# Patient Record
Sex: FEMALE | Race: WHITE | NOT HISPANIC OR LATINO | Employment: UNEMPLOYED | URBAN - METROPOLITAN AREA
[De-identification: names, ages, dates, MRNs, and addresses within clinical notes are randomized per-mention and may not be internally consistent; named-entity substitution may affect disease eponyms.]

---

## 2020-07-28 LAB — PAP: NORMAL

## 2020-11-23 LAB
C TRACH DNA SPEC QL PROBE+SIG AMP: NOT DETECTED
CULTURE MICRO: NORMAL
HBV SURFACE AG SERPL QL IA: NONREACTIVE
HCT VFR BLD AUTO: 37.2 %
HEMOGLOBIN: 12.1 G/DL (ref 11.7–15.7)
HIV 1+2 AB+HIV1 P24 AG SERPL QL IA: NONREACTIVE
N GONORRHOEA DNA SPEC QL PROBE+SIG AMP: NOT DETECTED
PLATELET # BLD AUTO: 310 10^9/L
RUBELLA ABY IGG: NORMAL

## 2021-04-01 ENCOUNTER — TELEPHONE (OUTPATIENT)
Dept: OBGYN | Facility: CLINIC | Age: 41
End: 2021-04-01

## 2021-04-01 NOTE — TELEPHONE ENCOUNTER
Phone Number: [phone]  Date of Request: 04/01/2021  Transferring From: [facility]  Reason: Work   LMP: 9/27/2020  CHERRIE: [id number]  Insurance: [facility]  Does patient want to see OB or Midwife: OB   Patient aware records need to be sent: YES

## 2021-04-07 ENCOUNTER — TRANSCRIBE ORDERS (OUTPATIENT)
Dept: MATERNAL FETAL MEDICINE | Facility: CLINIC | Age: 41
End: 2021-04-07

## 2021-04-07 ENCOUNTER — PRE VISIT (OUTPATIENT)
Dept: MATERNAL FETAL MEDICINE | Facility: CLINIC | Age: 41
End: 2021-04-07

## 2021-04-07 DIAGNOSIS — O09.522 MULTIGRAVIDA OF ADVANCED MATERNAL AGE IN SECOND TRIMESTER: Primary | ICD-10-CM

## 2021-04-07 DIAGNOSIS — O26.90 PREGNANCY RELATED CONDITION, ANTEPARTUM: Primary | ICD-10-CM

## 2021-04-07 NOTE — TELEPHONE ENCOUNTER
Transfer of prenatal care approved by JOANNA.     JOANNA saw that patient has MFM appt scheduled elsewhere on 04/18/21 and she put in a referral so that patient can be seen by RD MFM. If patient desires, may schedule with RD MFM on 04/18/21 if they have availability.     LVMTCB to let patient know transfer was approved and get her scheduled.     When patient calls back, please give her phone number for scheduling with MFM. If patient desires, please coordinate 30 min new prenatal midwife appt with her MFM appt.       Marcie

## 2021-04-08 NOTE — TELEPHONE ENCOUNTER
Transfer of prenatal care approved by RR.     Called patient to get scheduled with RR.     Patient needs to consider timing of her MFM appt so she will call back when she has a better view of her schedule to try to make both appts back to back, or at least all in one day.     When patient calls back, please warm transfer her to me by messaging me on Teams.       Marcie

## 2021-04-09 NOTE — TELEPHONE ENCOUNTER
Patient scheduled for new prenatal with RR on 04/15/21. Records sent for scanning into patient's chart.     Marcie

## 2021-04-12 ENCOUNTER — OFFICE VISIT (OUTPATIENT)
Dept: MATERNAL FETAL MEDICINE | Facility: CLINIC | Age: 41
End: 2021-04-12
Attending: ADVANCED PRACTICE MIDWIFE
Payer: COMMERCIAL

## 2021-04-12 ENCOUNTER — HOSPITAL ENCOUNTER (OUTPATIENT)
Dept: ULTRASOUND IMAGING | Facility: CLINIC | Age: 41
End: 2021-04-12
Attending: ADVANCED PRACTICE MIDWIFE
Payer: COMMERCIAL

## 2021-04-12 DIAGNOSIS — O36.5990 PREGNANCY AFFECTED BY FETAL GROWTH RESTRICTION: Primary | ICD-10-CM

## 2021-04-12 DIAGNOSIS — O26.90 PREGNANCY RELATED CONDITION, ANTEPARTUM: ICD-10-CM

## 2021-04-12 PROCEDURE — 59025 FETAL NON-STRESS TEST: CPT

## 2021-04-12 PROCEDURE — 76821 MIDDLE CEREBRAL ARTERY ECHO: CPT | Mod: 26 | Performed by: OBSTETRICS & GYNECOLOGY

## 2021-04-12 PROCEDURE — 59025 FETAL NON-STRESS TEST: CPT | Mod: 26 | Performed by: OBSTETRICS & GYNECOLOGY

## 2021-04-12 PROCEDURE — 76820 UMBILICAL ARTERY ECHO: CPT | Mod: 26 | Performed by: OBSTETRICS & GYNECOLOGY

## 2021-04-12 PROCEDURE — 99203 OFFICE O/P NEW LOW 30 MIN: CPT | Mod: 25 | Performed by: OBSTETRICS & GYNECOLOGY

## 2021-04-12 PROCEDURE — 76811 OB US DETAILED SNGL FETUS: CPT | Mod: 26 | Performed by: OBSTETRICS & GYNECOLOGY

## 2021-04-12 PROCEDURE — 76821 MIDDLE CEREBRAL ARTERY ECHO: CPT

## 2021-04-12 NOTE — NURSING NOTE
NST Performed due to FGR.  Dr. Morales reviewed efm tracing. See NST/BPP Doc Flowsheet tab.  Brittany Merino RN

## 2021-04-12 NOTE — PROGRESS NOTES
"Please see \"Imaging\" tab under \"Chart Review\" for details of today's visit.    Arlen Morales MD PhD  Maternal Fetal Medicine     "

## 2021-04-15 ENCOUNTER — OFFICE VISIT (OUTPATIENT)
Dept: MATERNAL FETAL MEDICINE | Facility: CLINIC | Age: 41
End: 2021-04-15
Attending: OBSTETRICS & GYNECOLOGY
Payer: COMMERCIAL

## 2021-04-15 ENCOUNTER — HOSPITAL ENCOUNTER (OUTPATIENT)
Dept: ULTRASOUND IMAGING | Facility: CLINIC | Age: 41
End: 2021-04-15
Attending: OBSTETRICS & GYNECOLOGY
Payer: COMMERCIAL

## 2021-04-15 ENCOUNTER — PRENATAL OFFICE VISIT (OUTPATIENT)
Dept: OBGYN | Facility: CLINIC | Age: 41
End: 2021-04-15
Payer: COMMERCIAL

## 2021-04-15 VITALS — DIASTOLIC BLOOD PRESSURE: 61 MMHG | WEIGHT: 139 LBS | SYSTOLIC BLOOD PRESSURE: 107 MMHG

## 2021-04-15 DIAGNOSIS — O36.5990 PREGNANCY AFFECTED BY FETAL GROWTH RESTRICTION: ICD-10-CM

## 2021-04-15 DIAGNOSIS — O09.529 SUPERVISION OF HIGH-RISK PREGNANCY OF ELDERLY MULTIGRAVIDA: Primary | ICD-10-CM

## 2021-04-15 PROCEDURE — 99207 PR FIRST OB VISIT: CPT | Performed by: OBSTETRICS & GYNECOLOGY

## 2021-04-15 PROCEDURE — 76815 OB US LIMITED FETUS(S): CPT

## 2021-04-15 PROCEDURE — 76816 OB US FOLLOW-UP PER FETUS: CPT | Mod: 26 | Performed by: OBSTETRICS & GYNECOLOGY

## 2021-04-15 PROCEDURE — 76820 UMBILICAL ARTERY ECHO: CPT | Mod: 26 | Performed by: OBSTETRICS & GYNECOLOGY

## 2021-04-15 PROCEDURE — 59025 FETAL NON-STRESS TEST: CPT

## 2021-04-15 PROCEDURE — 59025 FETAL NON-STRESS TEST: CPT | Mod: 26 | Performed by: OBSTETRICS & GYNECOLOGY

## 2021-04-15 PROCEDURE — 76816 OB US FOLLOW-UP PER FETUS: CPT

## 2021-04-15 RX ORDER — PRENATAL VIT/IRON FUM/FOLIC AC 27MG-0.8MG
1 TABLET ORAL DAILY
COMMUNITY
End: 2023-10-09

## 2021-04-15 NOTE — NURSING NOTE
NST Performed due to Fetal growth restriction.  Dr. Capone reviewed efm tracing. See NST/BPP Doc Flowsheet tab.

## 2021-04-15 NOTE — Clinical Note
Can you please call patient and help her get set up for her COVID vaccines?  She wants to do them prior to her tdap.  Thanks. RR

## 2021-04-15 NOTE — PROGRESS NOTES
SUBJECTIVE:   Estrellita Caballero is a 40 year old female  at 28w4d by 8+1 wk US who presents for a new transfer of care visit.  She moved to [place] one month ago from [place] where she has previously established care.   She is feeling well today.  Reports normal FM.  No contractions.  This is a girl.   First baby delivered normally at 38 wks, boy 5-12 at birth.     Pregnancy is complicated by:  AMA - had a normal aneuploidy screening early on  Placenta previa which has now resolved  FGR -- with MFM US here showing  EFW 8th percentile, AC 10th percentile on .  Patient states that she had a growth US in [place] yesterday which showed overall growth at 16%.  A repeat growth US was done today although the report is not available.   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   She is currently scheduled for twice weekly BPP's and q 3 wk growth US's.  Also scheduled for fetal ECHO given small pericardial effusion noted on US .      Patient would like to get set up for her COVID vaccines prior to doing the tdap.     OB History    Para Term  AB Living   2 1 1 0 0 1   SAB TAB Ectopic Multiple Live Births   0 0 0 0 1      # Outcome Date GA Lbr Jalen/2nd Weight Sex Delivery Anes PTL Lv   2 Current            1 Term      Vag-Spont   ANDRES        Current Outpatient Medications   Medication     Prenatal Vit-Fe Fumarate-FA (PRENATAL MULTIVITAMIN W/IRON) 27-0.8 MG tablet     No current facility-administered medications for this visit.        No Known Allergies    EXAM:  LMP 2020  107/61  85 139 lbs     GENERAL: WDWN WF in NAD  normal respiratory and cardiovascular effort   ABDOMEN: S, NT, no palpable masses or hepatosplenomegaly  Uterus gravid, NT  MUSCULOSKELETAL: no obvious abnormalities.  NEUROLOGICAL: normal strength, sensation, mental status  PSYCH: normal affect, appropriate  PELVIC:  - deferred.     ASSESSMENT/ PLAN:  IUP @  28w4d by 8w1d US  Estimated Date of Delivery: 2021     Encounter Diagnoses   Name Primary?     Supervision of high-risk pregnancy of elderly multigravida Yes     Pregnancy affected by fetal growth restriction       Prenatal chart was reviewed today along with MFM US from 21  Reviewed appt schedule and answered patient questions regarding  testing, normal expectations for prenatal care with FGR fetus, possible IOL prior to CHERRIE pending fetal well being.   Patient will be set up for COVID vaccine and plan tdap afterward.     Orders Placed This Encounter   Procedures     CBC with platelets     Hepatitis B surface antigen     OB hemoglobin     Conventional pap smear, diagnostic     HIV Antigen Antibody Combo     Rubella Antibody IgG Quantitative     Return to Clinic in 4 weeks or sooner if problems arise.    Laura Jurado MD

## 2021-04-20 ENCOUNTER — IMMUNIZATION (OUTPATIENT)
Dept: NURSING | Facility: CLINIC | Age: 41
End: 2021-04-20
Payer: COMMERCIAL

## 2021-04-20 ENCOUNTER — OFFICE VISIT (OUTPATIENT)
Dept: MATERNAL FETAL MEDICINE | Facility: CLINIC | Age: 41
End: 2021-04-20
Attending: OBSTETRICS & GYNECOLOGY
Payer: COMMERCIAL

## 2021-04-20 ENCOUNTER — HOSPITAL ENCOUNTER (OUTPATIENT)
Dept: ULTRASOUND IMAGING | Facility: CLINIC | Age: 41
End: 2021-04-20
Attending: OBSTETRICS & GYNECOLOGY
Payer: COMMERCIAL

## 2021-04-20 DIAGNOSIS — O36.5990 PREGNANCY AFFECTED BY FETAL GROWTH RESTRICTION: Primary | ICD-10-CM

## 2021-04-20 DIAGNOSIS — O36.5990 PREGNANCY AFFECTED BY FETAL GROWTH RESTRICTION: ICD-10-CM

## 2021-04-20 PROCEDURE — 76820 UMBILICAL ARTERY ECHO: CPT | Mod: 26 | Performed by: OBSTETRICS & GYNECOLOGY

## 2021-04-20 PROCEDURE — 59025 FETAL NON-STRESS TEST: CPT | Mod: 26 | Performed by: OBSTETRICS & GYNECOLOGY

## 2021-04-20 PROCEDURE — 59025 FETAL NON-STRESS TEST: CPT

## 2021-04-20 PROCEDURE — 0001A PR COVID VAC PFIZER DIL RECON 30 MCG/0.3 ML IM: CPT

## 2021-04-20 PROCEDURE — 76820 UMBILICAL ARTERY ECHO: CPT

## 2021-04-20 PROCEDURE — 91300 PR COVID VAC PFIZER DIL RECON 30 MCG/0.3 ML IM: CPT

## 2021-04-20 PROCEDURE — 76815 OB US LIMITED FETUS(S): CPT | Mod: 26 | Performed by: OBSTETRICS & GYNECOLOGY

## 2021-04-20 NOTE — PROGRESS NOTES
"Please see \"Imaging\" tab under \"Chart Review\" for details of today's visit.    Lance Rueda    "

## 2021-04-23 ENCOUNTER — HOSPITAL ENCOUNTER (OUTPATIENT)
Dept: ULTRASOUND IMAGING | Facility: CLINIC | Age: 41
End: 2021-04-23
Attending: OBSTETRICS & GYNECOLOGY
Payer: COMMERCIAL

## 2021-04-23 ENCOUNTER — OFFICE VISIT (OUTPATIENT)
Dept: MATERNAL FETAL MEDICINE | Facility: CLINIC | Age: 41
End: 2021-04-23
Attending: OBSTETRICS & GYNECOLOGY
Payer: COMMERCIAL

## 2021-04-23 DIAGNOSIS — O36.5990 PREGNANCY AFFECTED BY FETAL GROWTH RESTRICTION: ICD-10-CM

## 2021-04-23 DIAGNOSIS — O36.5990 PREGNANCY AFFECTED BY FETAL GROWTH RESTRICTION: Primary | ICD-10-CM

## 2021-04-23 PROCEDURE — 76815 OB US LIMITED FETUS(S): CPT

## 2021-04-23 PROCEDURE — 76820 UMBILICAL ARTERY ECHO: CPT | Mod: 26 | Performed by: OBSTETRICS & GYNECOLOGY

## 2021-04-23 PROCEDURE — 59025 FETAL NON-STRESS TEST: CPT | Mod: 26 | Performed by: OBSTETRICS & GYNECOLOGY

## 2021-04-23 PROCEDURE — 59025 FETAL NON-STRESS TEST: CPT

## 2021-04-23 PROCEDURE — 76816 OB US FOLLOW-UP PER FETUS: CPT | Mod: 26 | Performed by: OBSTETRICS & GYNECOLOGY

## 2021-04-23 PROCEDURE — 76816 OB US FOLLOW-UP PER FETUS: CPT

## 2021-04-23 NOTE — NURSING NOTE
NST Performed due to FGR.   reviewed efm tracing. See NST/BPP Doc Flowsheet tab.  Brittany Merino RN

## 2021-04-23 NOTE — PROGRESS NOTES
"Please see \"Imaging\" tab under \"Chart Review\" for details of today's US at the [facility].    Roderick Diaz MD  Maternal-Fetal Medicine      "

## 2021-04-27 ENCOUNTER — PRENATAL OFFICE VISIT (OUTPATIENT)
Dept: OBGYN | Facility: CLINIC | Age: 41
End: 2021-04-27
Payer: COMMERCIAL

## 2021-04-27 ENCOUNTER — HOSPITAL ENCOUNTER (OUTPATIENT)
Dept: ULTRASOUND IMAGING | Facility: CLINIC | Age: 41
End: 2021-04-27
Attending: OBSTETRICS & GYNECOLOGY
Payer: COMMERCIAL

## 2021-04-27 ENCOUNTER — OFFICE VISIT (OUTPATIENT)
Dept: MATERNAL FETAL MEDICINE | Facility: CLINIC | Age: 41
End: 2021-04-27
Attending: OBSTETRICS & GYNECOLOGY
Payer: COMMERCIAL

## 2021-04-27 VITALS
WEIGHT: 140.6 LBS | SYSTOLIC BLOOD PRESSURE: 111 MMHG | BODY MASS INDEX: 22.07 KG/M2 | OXYGEN SATURATION: 100 % | HEART RATE: 86 BPM | DIASTOLIC BLOOD PRESSURE: 62 MMHG | HEIGHT: 67 IN

## 2021-04-27 DIAGNOSIS — O36.5990 PREGNANCY AFFECTED BY FETAL GROWTH RESTRICTION: ICD-10-CM

## 2021-04-27 DIAGNOSIS — O09.529 SUPERVISION OF HIGH-RISK PREGNANCY OF ELDERLY MULTIGRAVIDA: Primary | ICD-10-CM

## 2021-04-27 PROCEDURE — 76815 OB US LIMITED FETUS(S): CPT | Mod: 26 | Performed by: OBSTETRICS & GYNECOLOGY

## 2021-04-27 PROCEDURE — 76820 UMBILICAL ARTERY ECHO: CPT | Mod: 26 | Performed by: OBSTETRICS & GYNECOLOGY

## 2021-04-27 PROCEDURE — 76820 UMBILICAL ARTERY ECHO: CPT

## 2021-04-27 PROCEDURE — 99207 PR PRENATAL VISIT: CPT | Performed by: OBSTETRICS & GYNECOLOGY

## 2021-04-27 PROCEDURE — 59025 FETAL NON-STRESS TEST: CPT

## 2021-04-27 SDOH — HEALTH STABILITY: MENTAL HEALTH: HOW OFTEN DO YOU HAVE 6 OR MORE DRINKS ON ONE OCCASION?: NOT ASKED

## 2021-04-27 SDOH — HEALTH STABILITY: MENTAL HEALTH: HOW OFTEN DO YOU HAVE A DRINK CONTAINING ALCOHOL?: NOT ASKED

## 2021-04-27 SDOH — HEALTH STABILITY: MENTAL HEALTH: HOW MANY STANDARD DRINKS CONTAINING ALCOHOL DO YOU HAVE ON A TYPICAL DAY?: NOT ASKED

## 2021-04-27 ASSESSMENT — MIFFLIN-ST. JEOR: SCORE: 1350.39

## 2021-04-27 NOTE — PROGRESS NOTES
Estrellita Caballero was seen for an ultrasound today at the Maternal-Fetal Medicine center.      For the details of the ultrasound please see the report which can be found under the imaging tab.      Arlen Trejo MD  , OB/GYN  Maternal-Fetal Medicine  [email or web address]  [phone] (Main M Office)  [phone] or [phone] (for 24 hour MFM questions)  [phone] (Pager)

## 2021-04-27 NOTE — PROGRESS NOTES
Please see ultrasound report under Imaging tab for details of today's ultrasound.    Augusta Capone MD  Maternal-Fetal Medicine

## 2021-04-27 NOTE — NURSING NOTE
NST Performed due to FGR.   reviewed efm tracing. See NST/BPP Doc Flowsheet tab.  Kailee Ragland RN

## 2021-04-27 NOTE — PROGRESS NOTES
30w2d  No complaints.  Baby is moving well. US last week showed no pericardial effusion and normal dopplers and fluid.   Has growth US next week.  Had first covid shot last week.  RTC 2 wks.  RR

## 2021-04-29 DIAGNOSIS — O36.5990 PREGNANCY AFFECTED BY FETAL GROWTH RESTRICTION: Primary | ICD-10-CM

## 2021-04-30 ENCOUNTER — OFFICE VISIT (OUTPATIENT)
Dept: MATERNAL FETAL MEDICINE | Facility: CLINIC | Age: 41
End: 2021-04-30
Attending: OBSTETRICS & GYNECOLOGY
Payer: COMMERCIAL

## 2021-04-30 ENCOUNTER — HOSPITAL ENCOUNTER (OUTPATIENT)
Dept: ULTRASOUND IMAGING | Facility: CLINIC | Age: 41
End: 2021-04-30
Attending: OBSTETRICS & GYNECOLOGY
Payer: COMMERCIAL

## 2021-04-30 DIAGNOSIS — O36.5990 PREGNANCY AFFECTED BY FETAL GROWTH RESTRICTION: ICD-10-CM

## 2021-04-30 PROCEDURE — 76815 OB US LIMITED FETUS(S): CPT | Mod: 26 | Performed by: OBSTETRICS & GYNECOLOGY

## 2021-04-30 PROCEDURE — 59025 FETAL NON-STRESS TEST: CPT

## 2021-04-30 PROCEDURE — 76820 UMBILICAL ARTERY ECHO: CPT

## 2021-04-30 PROCEDURE — 76820 UMBILICAL ARTERY ECHO: CPT | Mod: 26 | Performed by: OBSTETRICS & GYNECOLOGY

## 2021-04-30 PROCEDURE — 59025 FETAL NON-STRESS TEST: CPT | Mod: 26 | Performed by: OBSTETRICS & GYNECOLOGY

## 2021-05-02 ENCOUNTER — HEALTH MAINTENANCE LETTER (OUTPATIENT)
Age: 41
End: 2021-05-02

## 2021-05-04 ENCOUNTER — OFFICE VISIT (OUTPATIENT)
Dept: MATERNAL FETAL MEDICINE | Facility: CLINIC | Age: 41
End: 2021-05-04
Attending: OBSTETRICS & GYNECOLOGY
Payer: COMMERCIAL

## 2021-05-04 ENCOUNTER — HOSPITAL ENCOUNTER (OUTPATIENT)
Dept: ULTRASOUND IMAGING | Facility: CLINIC | Age: 41
End: 2021-05-04
Attending: OBSTETRICS & GYNECOLOGY
Payer: COMMERCIAL

## 2021-05-04 DIAGNOSIS — O36.5990 PREGNANCY AFFECTED BY FETAL GROWTH RESTRICTION: Primary | ICD-10-CM

## 2021-05-04 DIAGNOSIS — O36.5990 PREGNANCY AFFECTED BY FETAL GROWTH RESTRICTION: ICD-10-CM

## 2021-05-04 PROCEDURE — 76816 OB US FOLLOW-UP PER FETUS: CPT | Mod: 26 | Performed by: OBSTETRICS & GYNECOLOGY

## 2021-05-04 PROCEDURE — 76820 UMBILICAL ARTERY ECHO: CPT | Mod: 26 | Performed by: OBSTETRICS & GYNECOLOGY

## 2021-05-04 PROCEDURE — 76816 OB US FOLLOW-UP PER FETUS: CPT

## 2021-05-04 PROCEDURE — 76820 UMBILICAL ARTERY ECHO: CPT

## 2021-05-04 PROCEDURE — 59025 FETAL NON-STRESS TEST: CPT

## 2021-05-04 PROCEDURE — 59025 FETAL NON-STRESS TEST: CPT | Mod: 26 | Performed by: OBSTETRICS & GYNECOLOGY

## 2021-05-04 NOTE — PROGRESS NOTES
"Please see \"Imaging\" tab under \"Chart Review\" for details of today's US at the [facility].    Roderick Diaz MD  Maternal-Fetal Medicine      "

## 2021-05-06 ENCOUNTER — HOSPITAL ENCOUNTER (OUTPATIENT)
Dept: ULTRASOUND IMAGING | Facility: CLINIC | Age: 41
End: 2021-05-06
Attending: OBSTETRICS & GYNECOLOGY
Payer: COMMERCIAL

## 2021-05-06 ENCOUNTER — OFFICE VISIT (OUTPATIENT)
Dept: MATERNAL FETAL MEDICINE | Facility: CLINIC | Age: 41
End: 2021-05-06
Attending: OBSTETRICS & GYNECOLOGY
Payer: COMMERCIAL

## 2021-05-06 DIAGNOSIS — O36.5990 PREGNANCY AFFECTED BY FETAL GROWTH RESTRICTION: ICD-10-CM

## 2021-05-06 PROCEDURE — 76820 UMBILICAL ARTERY ECHO: CPT | Mod: 26 | Performed by: OBSTETRICS & GYNECOLOGY

## 2021-05-06 PROCEDURE — 76820 UMBILICAL ARTERY ECHO: CPT

## 2021-05-06 PROCEDURE — 76815 OB US LIMITED FETUS(S): CPT | Mod: 26 | Performed by: OBSTETRICS & GYNECOLOGY

## 2021-05-06 PROCEDURE — 59025 FETAL NON-STRESS TEST: CPT | Mod: 26 | Performed by: OBSTETRICS & GYNECOLOGY

## 2021-05-06 PROCEDURE — 59025 FETAL NON-STRESS TEST: CPT

## 2021-05-06 NOTE — PROGRESS NOTES
Please see the imaging tab for details of the ultrasound performed today.    Sherry Dixon MD  Specialist in Maternal-Fetal Medicine

## 2021-05-10 ENCOUNTER — PRENATAL OFFICE VISIT (OUTPATIENT)
Dept: OBGYN | Facility: CLINIC | Age: 41
End: 2021-05-10
Payer: COMMERCIAL

## 2021-05-10 ENCOUNTER — OFFICE VISIT (OUTPATIENT)
Dept: MATERNAL FETAL MEDICINE | Facility: CLINIC | Age: 41
End: 2021-05-10
Attending: OBSTETRICS & GYNECOLOGY
Payer: COMMERCIAL

## 2021-05-10 ENCOUNTER — HOSPITAL ENCOUNTER (OUTPATIENT)
Dept: ULTRASOUND IMAGING | Facility: CLINIC | Age: 41
End: 2021-05-10
Attending: OBSTETRICS & GYNECOLOGY
Payer: COMMERCIAL

## 2021-05-10 VITALS
HEIGHT: 67 IN | HEART RATE: 89 BPM | BODY MASS INDEX: 22.46 KG/M2 | DIASTOLIC BLOOD PRESSURE: 50 MMHG | SYSTOLIC BLOOD PRESSURE: 113 MMHG | WEIGHT: 143.1 LBS | OXYGEN SATURATION: 100 %

## 2021-05-10 DIAGNOSIS — O09.523 MULTIGRAVIDA OF ADVANCED MATERNAL AGE IN THIRD TRIMESTER: ICD-10-CM

## 2021-05-10 DIAGNOSIS — O36.5990 PREGNANCY AFFECTED BY FETAL GROWTH RESTRICTION: Primary | ICD-10-CM

## 2021-05-10 DIAGNOSIS — O36.5990 PREGNANCY AFFECTED BY FETAL GROWTH RESTRICTION: ICD-10-CM

## 2021-05-10 PROCEDURE — 76815 OB US LIMITED FETUS(S): CPT

## 2021-05-10 PROCEDURE — 99207 PR PRENATAL VISIT: CPT | Performed by: OBSTETRICS & GYNECOLOGY

## 2021-05-10 PROCEDURE — 76815 OB US LIMITED FETUS(S): CPT | Mod: 26 | Performed by: OBSTETRICS & GYNECOLOGY

## 2021-05-10 PROCEDURE — 76820 UMBILICAL ARTERY ECHO: CPT | Mod: 26 | Performed by: OBSTETRICS & GYNECOLOGY

## 2021-05-10 PROCEDURE — 59025 FETAL NON-STRESS TEST: CPT | Mod: 26 | Performed by: OBSTETRICS & GYNECOLOGY

## 2021-05-10 PROCEDURE — 59025 FETAL NON-STRESS TEST: CPT

## 2021-05-10 ASSESSMENT — MIFFLIN-ST. JEOR: SCORE: 1361.73

## 2021-05-10 NOTE — PROGRESS NOTES
32w1d  Active fetal movement. No leaking or bleeding. No contractions.  She is getting second COVID19 vaccine tomorrow.    Feeling frustrated by [facility]'s mixed messages about fetal pericardial effusion. EFW : 1420g (3lb 2oz)    US today:  1. Mac intrauterine pregnancy at 32w1d with fetal growth restriction (EFW 5th% on ) here for  surveillance.  2. The amniotic fluid volume appeared normal.  3. The umbilical artery dopplers again are intermittently elevated. There is no evidence of absent or reversed end diastolic flow.  4. There again is a trace pericardial effusion seen. There is no evidence of fetal hydrops.  5. The NST is reactive and reassuring.    Fetal echo sched .   Questions about IUGR and delivery timing. Discussed it depends on dopplers and BPPs.     RTC 2 weeks.   Arlen Fox MD

## 2021-05-10 NOTE — PROGRESS NOTES
The patient was seen for an ultrasound in the Maternal-Fetal Medicine Center at the [facility] today.  For a detailed report of the ultrasound examination, please see the ultrasound report which can be found under the imaging tab.    Sherin Cordoba MD  , OB/GYN  Maternal-Fetal Medicine  [phone] (Pager)

## 2021-05-10 NOTE — NURSING NOTE
NST Performed due to fetal growth restriction.  Dr. Cordoba reviewed efm tracing. See NST/BPP Doc Flowsheet tab.

## 2021-05-10 NOTE — PATIENT INSTRUCTIONS
Nurse line: [phone]    Estrellita -   I reviewed delivery recommendation timing for fetal growth restriction  As we discussed, in uncomplicated growth restriction, delivery between 38-39w6d is recommended.   If dopplers are elevated, then delivery is recommended at 37 weeks or at diagnosis if diagnosed later  If dopplers are absent or reversed, then  delivery is recommended, usually after the steroids shots for fetal lung maturity as we discussed.     I know it is complicated but these are the general guidelines we follow.

## 2021-05-11 ENCOUNTER — IMMUNIZATION (OUTPATIENT)
Dept: NURSING | Facility: CLINIC | Age: 41
End: 2021-05-11
Attending: INTERNAL MEDICINE
Payer: COMMERCIAL

## 2021-05-11 PROCEDURE — 0002A PR COVID VAC PFIZER DIL RECON 30 MCG/0.3 ML IM: CPT

## 2021-05-11 PROCEDURE — 91300 PR COVID VAC PFIZER DIL RECON 30 MCG/0.3 ML IM: CPT

## 2021-05-13 ENCOUNTER — HOSPITAL ENCOUNTER (OUTPATIENT)
Dept: ULTRASOUND IMAGING | Facility: CLINIC | Age: 41
End: 2021-05-13
Attending: OBSTETRICS & GYNECOLOGY
Payer: COMMERCIAL

## 2021-05-13 ENCOUNTER — OFFICE VISIT (OUTPATIENT)
Dept: MATERNAL FETAL MEDICINE | Facility: CLINIC | Age: 41
End: 2021-05-13
Attending: OBSTETRICS & GYNECOLOGY
Payer: COMMERCIAL

## 2021-05-13 DIAGNOSIS — O36.5990 PREGNANCY AFFECTED BY FETAL GROWTH RESTRICTION: ICD-10-CM

## 2021-05-13 DIAGNOSIS — O36.5990 PREGNANCY AFFECTED BY FETAL GROWTH RESTRICTION: Primary | ICD-10-CM

## 2021-05-13 PROCEDURE — 76820 UMBILICAL ARTERY ECHO: CPT | Mod: 26 | Performed by: OBSTETRICS & GYNECOLOGY

## 2021-05-13 PROCEDURE — 76815 OB US LIMITED FETUS(S): CPT | Mod: 26 | Performed by: OBSTETRICS & GYNECOLOGY

## 2021-05-13 PROCEDURE — 76815 OB US LIMITED FETUS(S): CPT

## 2021-05-13 PROCEDURE — 59025 FETAL NON-STRESS TEST: CPT | Mod: 26 | Performed by: OBSTETRICS & GYNECOLOGY

## 2021-05-13 PROCEDURE — 59025 FETAL NON-STRESS TEST: CPT

## 2021-05-13 NOTE — PROGRESS NOTES
"Please see \"Imaging\" tab under \"Chart Review\" for details of today's US at the [facility].    Roderick Diaz MD  Maternal-Fetal Medicine      "

## 2021-05-18 ENCOUNTER — OFFICE VISIT (OUTPATIENT)
Dept: MATERNAL FETAL MEDICINE | Facility: CLINIC | Age: 41
End: 2021-05-18
Attending: OBSTETRICS & GYNECOLOGY
Payer: COMMERCIAL

## 2021-05-18 ENCOUNTER — HOSPITAL ENCOUNTER (OUTPATIENT)
Dept: CARDIOLOGY | Facility: CLINIC | Age: 41
End: 2021-05-18
Attending: OBSTETRICS & GYNECOLOGY
Payer: COMMERCIAL

## 2021-05-18 ENCOUNTER — OFFICE VISIT (OUTPATIENT)
Dept: CARDIOLOGY | Facility: CLINIC | Age: 41
End: 2021-05-18
Payer: COMMERCIAL

## 2021-05-18 ENCOUNTER — HOSPITAL ENCOUNTER (OUTPATIENT)
Dept: ULTRASOUND IMAGING | Facility: CLINIC | Age: 41
End: 2021-05-18
Attending: OBSTETRICS & GYNECOLOGY
Payer: COMMERCIAL

## 2021-05-18 DIAGNOSIS — O36.5990 PREGNANCY AFFECTED BY FETAL GROWTH RESTRICTION: Primary | ICD-10-CM

## 2021-05-18 DIAGNOSIS — O36.5990 PREGNANCY AFFECTED BY FETAL GROWTH RESTRICTION: ICD-10-CM

## 2021-05-18 DIAGNOSIS — O35.BXX0 FETAL CARDIAC DISEASE AFFECTING PREGNANCY, SINGLE OR UNSPECIFIED FETUS: Primary | ICD-10-CM

## 2021-05-18 PROCEDURE — 76827 ECHO EXAM OF FETAL HEART: CPT | Mod: 26 | Performed by: PEDIATRICS

## 2021-05-18 PROCEDURE — 59025 FETAL NON-STRESS TEST: CPT | Mod: 26 | Performed by: OBSTETRICS & GYNECOLOGY

## 2021-05-18 PROCEDURE — 76820 UMBILICAL ARTERY ECHO: CPT | Mod: 26 | Performed by: OBSTETRICS & GYNECOLOGY

## 2021-05-18 PROCEDURE — 76815 OB US LIMITED FETUS(S): CPT | Mod: 26 | Performed by: OBSTETRICS & GYNECOLOGY

## 2021-05-18 PROCEDURE — 99202 OFFICE O/P NEW SF 15 MIN: CPT | Mod: 25 | Performed by: PEDIATRICS

## 2021-05-18 PROCEDURE — 93325 DOPPLER ECHO COLOR FLOW MAPG: CPT | Mod: 26 | Performed by: PEDIATRICS

## 2021-05-18 PROCEDURE — 93325 DOPPLER ECHO COLOR FLOW MAPG: CPT

## 2021-05-18 PROCEDURE — 76820 UMBILICAL ARTERY ECHO: CPT

## 2021-05-18 PROCEDURE — 59025 FETAL NON-STRESS TEST: CPT

## 2021-05-18 PROCEDURE — 76825 ECHO EXAM OF FETAL HEART: CPT | Mod: 26 | Performed by: PEDIATRICS

## 2021-05-18 NOTE — PROGRESS NOTES
[facility]   Heart Center Fetal Consult Note    Patient:  Estrellita Caballero MRN:  [id number]   YOB: 1980 Age:  40 year old   Date of Visit:  2021 PCP:  No Ref-Primary, Physician     Dear Dr. Morales,     I had the pleasure of seeing Estrellita Caballero at the [facility] on 2021 in fetal cardiology consultation for fetal echocardiogram results. She presented today accompanied by herself. As you know, she is a 40 year old  at 33w2d who presented for fetal echocardiogram today because of suspected pericardial effusion.    I performed and interpreted the fetal echocardiogram today, which demonstrated Normal fetal cardiac anatomy. Normal fetal intracardiac connections. Normal right and left ventricular size and function. Fetal heart rate is regular at 147 bpm. No effusion.      I reviewed the echo findings today with Estrellita Caballero. She is aware that the study was within normal limits with no major cardiac abnormalities. She is aware of the general limitations of fetal echocardiography. No additional fetal echocardiograms are recommended. No  cardiac follow-up is required.     Thank you for allowing me to participate in Estrellita's care. Please do not hesitate to contact me with questions or concerns.    This visit was separate from the performance and interpretation of the ultrasound. The majority of the time (>50%) was spent in counseling and coordination of care. I spent approximately 15 minutes in face-to-face time reviewing the above considerations.    Barrington Landaverde M.D.  Pediatric Cardiology  [address], 5th floor, [facility] [address]  Phone [phone]  Fax [phone]

## 2021-05-18 NOTE — NURSING NOTE
NST Performed due to fetal growth restriction.   reviewed efm tracing. See NST/BPP Doc Flowsheet tab..

## 2021-05-18 NOTE — PROGRESS NOTES
"Please see \"Imaging\" tab under \"Chart Review\" for details of today's US at the [facility].    Roderick Diaz MD  Maternal-Fetal Medicine      "

## 2021-05-21 ENCOUNTER — OFFICE VISIT (OUTPATIENT)
Dept: MATERNAL FETAL MEDICINE | Facility: CLINIC | Age: 41
End: 2021-05-21
Attending: OBSTETRICS & GYNECOLOGY
Payer: COMMERCIAL

## 2021-05-21 ENCOUNTER — RECORDS - HEALTHEAST (OUTPATIENT)
Dept: ADMINISTRATIVE | Facility: OTHER | Age: 41
End: 2021-05-21

## 2021-05-21 ENCOUNTER — HOSPITAL ENCOUNTER (OUTPATIENT)
Dept: ULTRASOUND IMAGING | Facility: CLINIC | Age: 41
End: 2021-05-21
Attending: OBSTETRICS & GYNECOLOGY
Payer: COMMERCIAL

## 2021-05-21 DIAGNOSIS — O36.5990 PREGNANCY AFFECTED BY FETAL GROWTH RESTRICTION: ICD-10-CM

## 2021-05-21 PROCEDURE — 76815 OB US LIMITED FETUS(S): CPT | Mod: 26 | Performed by: OBSTETRICS & GYNECOLOGY

## 2021-05-21 PROCEDURE — 76820 UMBILICAL ARTERY ECHO: CPT | Mod: 26 | Performed by: OBSTETRICS & GYNECOLOGY

## 2021-05-21 PROCEDURE — 59025 FETAL NON-STRESS TEST: CPT | Performed by: OBSTETRICS & GYNECOLOGY

## 2021-05-21 PROCEDURE — 59025 FETAL NON-STRESS TEST: CPT | Mod: 26 | Performed by: OBSTETRICS & GYNECOLOGY

## 2021-05-21 PROCEDURE — 76815 OB US LIMITED FETUS(S): CPT

## 2021-05-21 NOTE — PROGRESS NOTES
Please refer to ultrasound report under 'Imaging' Studies of 'Chart Review' tabs.    Jin Vilchis M.D.

## 2021-05-25 ENCOUNTER — OFFICE VISIT (OUTPATIENT)
Dept: MATERNAL FETAL MEDICINE | Facility: CLINIC | Age: 41
End: 2021-05-25
Attending: OBSTETRICS & GYNECOLOGY
Payer: COMMERCIAL

## 2021-05-25 ENCOUNTER — HOSPITAL ENCOUNTER (OUTPATIENT)
Dept: ULTRASOUND IMAGING | Facility: CLINIC | Age: 41
End: 2021-05-25
Attending: OBSTETRICS & GYNECOLOGY
Payer: COMMERCIAL

## 2021-05-25 DIAGNOSIS — O36.5990 PREGNANCY AFFECTED BY FETAL GROWTH RESTRICTION: ICD-10-CM

## 2021-05-25 DIAGNOSIS — O36.5990 PREGNANCY AFFECTED BY FETAL GROWTH RESTRICTION: Primary | ICD-10-CM

## 2021-05-25 PROBLEM — E28.2 PCOS (POLYCYSTIC OVARIAN SYNDROME): Status: ACTIVE | Noted: 2019-05-24

## 2021-05-25 PROBLEM — Z34.80 ENCOUNTER FOR SUPERVISION OF OTHER NORMAL PREGNANCY, UNSPECIFIED TRIMESTER: Status: ACTIVE | Noted: 2020-11-23

## 2021-05-25 PROBLEM — N97.9 INFERTILITY, FEMALE: Status: ACTIVE | Noted: 2019-05-24

## 2021-05-25 PROBLEM — O09.522 MULTIGRAVIDA OF ADVANCED MATERNAL AGE IN SECOND TRIMESTER: Status: ACTIVE | Noted: 2019-07-16

## 2021-05-25 PROCEDURE — 76816 OB US FOLLOW-UP PER FETUS: CPT

## 2021-05-25 PROCEDURE — 76816 OB US FOLLOW-UP PER FETUS: CPT | Mod: 26 | Performed by: OBSTETRICS & GYNECOLOGY

## 2021-05-25 PROCEDURE — 76820 UMBILICAL ARTERY ECHO: CPT | Mod: 26 | Performed by: OBSTETRICS & GYNECOLOGY

## 2021-05-25 PROCEDURE — 59025 FETAL NON-STRESS TEST: CPT | Mod: 26 | Performed by: OBSTETRICS & GYNECOLOGY

## 2021-05-25 PROCEDURE — 59025 FETAL NON-STRESS TEST: CPT

## 2021-05-25 NOTE — PROGRESS NOTES
Estrellita Caballero was seen for an ultrasound today at the Maternal-Fetal Medicine center.      For the details of the ultrasound please see the report which can be found under the imaging tab.      Arlen Trejo MD  , OB/GYN  Maternal-Fetal Medicine  [email or web address]  [phone] (Main M Office)  [phone] or [phone] (for 24 hour MFM questions)  [phone] (Pager)

## 2021-05-26 ENCOUNTER — PRENATAL OFFICE VISIT (OUTPATIENT)
Dept: OBGYN | Facility: CLINIC | Age: 41
End: 2021-05-26
Payer: COMMERCIAL

## 2021-05-26 VITALS
DIASTOLIC BLOOD PRESSURE: 66 MMHG | TEMPERATURE: 97.5 F | SYSTOLIC BLOOD PRESSURE: 106 MMHG | HEART RATE: 100 BPM | WEIGHT: 145 LBS | BODY MASS INDEX: 22.71 KG/M2

## 2021-05-26 DIAGNOSIS — O36.5990 PREGNANCY AFFECTED BY FETAL GROWTH RESTRICTION: ICD-10-CM

## 2021-05-26 DIAGNOSIS — O09.523 MULTIGRAVIDA OF ADVANCED MATERNAL AGE IN THIRD TRIMESTER: Primary | ICD-10-CM

## 2021-05-26 DIAGNOSIS — Z23 NEED FOR TDAP VACCINATION: ICD-10-CM

## 2021-05-26 PROCEDURE — 99207 PR PRENATAL VISIT: CPT | Performed by: OBSTETRICS & GYNECOLOGY

## 2021-05-26 PROCEDURE — 90471 IMMUNIZATION ADMIN: CPT | Performed by: OBSTETRICS & GYNECOLOGY

## 2021-05-26 PROCEDURE — 90715 TDAP VACCINE 7 YRS/> IM: CPT | Performed by: OBSTETRICS & GYNECOLOGY

## 2021-05-26 NOTE — PROGRESS NOTES
"34w3d  Feeling good. Active fetal movement. No leaking or bleeding. No painful contractions.   Fetal growth improved from EFW 5% to 9%. (/ EFW 1997g, 4lb 6oz, overall 9%, AC 8%). UA dopplers intermittently elevated. Per MFM: \"We will continue twice weekly  testing and reassess fetal growth in three weeks if not delivered. If Dopplers remain elevated, however, delivery will be recommended at 37 weeks.\"  Patient concerned about having baby at 37 weeks, questions about pushing it out to later in the 37th week if things remain stable. Discussed recommendations, ultimately her choice. Would recommend continued  testing if IOL later in 37th week. She is very agreeable to IOL sooner if worsening fetal status.   Discussed IOL process, delivery team including residents and med students, potential NICU. Her son was born at 38w2d, 5lb 12oz, went to NICU for 4 days because did not breathe right away, no clear reason \"just stunned,\" no issues after.   TdaP today.  RTC weekly, will plan to collect GBS next appt.   Arlen Fox MD  "

## 2021-05-27 ENCOUNTER — AMBULATORY - HEALTHEAST (OUTPATIENT)
Dept: MATERNAL FETAL MEDICINE | Facility: HOSPITAL | Age: 41
End: 2021-05-27

## 2021-05-28 ENCOUNTER — HOSPITAL ENCOUNTER (OUTPATIENT)
Dept: ULTRASOUND IMAGING | Facility: CLINIC | Age: 41
End: 2021-05-28
Attending: OBSTETRICS & GYNECOLOGY
Payer: COMMERCIAL

## 2021-05-28 ENCOUNTER — OFFICE VISIT (OUTPATIENT)
Dept: MATERNAL FETAL MEDICINE | Facility: CLINIC | Age: 41
End: 2021-05-28
Attending: OBSTETRICS & GYNECOLOGY
Payer: COMMERCIAL

## 2021-05-28 DIAGNOSIS — O36.5990 PREGNANCY AFFECTED BY FETAL GROWTH RESTRICTION: ICD-10-CM

## 2021-05-28 DIAGNOSIS — O36.5990 PREGNANCY AFFECTED BY FETAL GROWTH RESTRICTION: Primary | ICD-10-CM

## 2021-05-28 PROCEDURE — 76820 UMBILICAL ARTERY ECHO: CPT | Mod: 26 | Performed by: OBSTETRICS & GYNECOLOGY

## 2021-05-28 PROCEDURE — 59025 FETAL NON-STRESS TEST: CPT | Mod: 26 | Performed by: OBSTETRICS & GYNECOLOGY

## 2021-05-28 PROCEDURE — 59025 FETAL NON-STRESS TEST: CPT

## 2021-05-28 PROCEDURE — 76815 OB US LIMITED FETUS(S): CPT

## 2021-05-28 PROCEDURE — 76820 UMBILICAL ARTERY ECHO: CPT

## 2021-05-28 PROCEDURE — 76815 OB US LIMITED FETUS(S): CPT | Mod: 26 | Performed by: OBSTETRICS & GYNECOLOGY

## 2021-05-28 NOTE — PROGRESS NOTES
"Please see \"Imaging\" tab under \"Chart Review\" for details of today's US at the [facility].    Roderick Diaz MD  Maternal-Fetal Medicine      "

## 2021-06-01 ENCOUNTER — RECORDS - HEALTHEAST (OUTPATIENT)
Dept: ULTRASOUND IMAGING | Facility: HOSPITAL | Age: 41
End: 2021-06-01

## 2021-06-01 ENCOUNTER — OFFICE VISIT - HEALTHEAST (OUTPATIENT)
Dept: MATERNAL FETAL MEDICINE | Facility: HOSPITAL | Age: 41
End: 2021-06-01

## 2021-06-01 ENCOUNTER — RECORDS - HEALTHEAST (OUTPATIENT)
Dept: ADMINISTRATIVE | Facility: OTHER | Age: 41
End: 2021-06-01

## 2021-06-01 DIAGNOSIS — O26.90 PREGNANCY RELATED CONDITIONS, UNSPECIFIED, UNSPECIFIED TRIMESTER: ICD-10-CM

## 2021-06-01 DIAGNOSIS — O26.90 PREGNANCY, ANTEPARTUM, COMPLICATIONS: ICD-10-CM

## 2021-06-01 DIAGNOSIS — O36.5990 PREGNANCY AFFECTED BY FETAL GROWTH RESTRICTION: ICD-10-CM

## 2021-06-04 ENCOUNTER — OFFICE VISIT (OUTPATIENT)
Dept: MATERNAL FETAL MEDICINE | Facility: CLINIC | Age: 41
End: 2021-06-04
Attending: OBSTETRICS & GYNECOLOGY
Payer: COMMERCIAL

## 2021-06-04 ENCOUNTER — PRENATAL OFFICE VISIT (OUTPATIENT)
Dept: OBGYN | Facility: CLINIC | Age: 41
End: 2021-06-04
Payer: COMMERCIAL

## 2021-06-04 ENCOUNTER — HOSPITAL ENCOUNTER (OUTPATIENT)
Dept: ULTRASOUND IMAGING | Facility: CLINIC | Age: 41
End: 2021-06-04
Attending: OBSTETRICS & GYNECOLOGY
Payer: COMMERCIAL

## 2021-06-04 VITALS
HEART RATE: 94 BPM | DIASTOLIC BLOOD PRESSURE: 61 MMHG | OXYGEN SATURATION: 97 % | BODY MASS INDEX: 22.62 KG/M2 | SYSTOLIC BLOOD PRESSURE: 106 MMHG | WEIGHT: 144.1 LBS | HEIGHT: 67 IN

## 2021-06-04 DIAGNOSIS — O09.523 MULTIGRAVIDA OF ADVANCED MATERNAL AGE IN THIRD TRIMESTER: Primary | ICD-10-CM

## 2021-06-04 DIAGNOSIS — O36.5990 PREGNANCY AFFECTED BY FETAL GROWTH RESTRICTION: Primary | ICD-10-CM

## 2021-06-04 DIAGNOSIS — O36.5990 PREGNANCY AFFECTED BY FETAL GROWTH RESTRICTION: ICD-10-CM

## 2021-06-04 LAB — HGB BLD-MCNC: 11.7 G/DL (ref 11.7–15.7)

## 2021-06-04 PROCEDURE — 99N1025 PR STATISTIC OBHBG - HEMOGLOBIN: Performed by: OBSTETRICS & GYNECOLOGY

## 2021-06-04 PROCEDURE — 99207 PR PRENATAL VISIT: CPT | Performed by: OBSTETRICS & GYNECOLOGY

## 2021-06-04 PROCEDURE — 76820 UMBILICAL ARTERY ECHO: CPT

## 2021-06-04 PROCEDURE — 59025 FETAL NON-STRESS TEST: CPT

## 2021-06-04 PROCEDURE — 87653 STREP B DNA AMP PROBE: CPT | Performed by: OBSTETRICS & GYNECOLOGY

## 2021-06-04 PROCEDURE — 76820 UMBILICAL ARTERY ECHO: CPT | Mod: 26 | Performed by: OBSTETRICS & GYNECOLOGY

## 2021-06-04 PROCEDURE — 59025 FETAL NON-STRESS TEST: CPT | Mod: 26 | Performed by: OBSTETRICS & GYNECOLOGY

## 2021-06-04 PROCEDURE — 36415 COLL VENOUS BLD VENIPUNCTURE: CPT | Performed by: OBSTETRICS & GYNECOLOGY

## 2021-06-04 PROCEDURE — 76815 OB US LIMITED FETUS(S): CPT | Mod: 26 | Performed by: OBSTETRICS & GYNECOLOGY

## 2021-06-04 ASSESSMENT — MIFFLIN-ST. JEOR: SCORE: 1366.26

## 2021-06-04 ASSESSMENT — PATIENT HEALTH QUESTIONNAIRE - PHQ9: SUM OF ALL RESPONSES TO PHQ QUESTIONS 1-9: 2

## 2021-06-04 NOTE — PROGRESS NOTES
"Please see \"Imaging\" tab under \"Chart Review\" for details of today's US at the [facility].    Roderick Diaz MD  Maternal-Fetal Medicine      "

## 2021-06-05 LAB
GP B STREP DNA SPEC QL NAA+PROBE: NEGATIVE
SPECIMEN SOURCE: NORMAL

## 2021-06-05 NOTE — PROGRESS NOTES
35w5d  Active fetal movement. No contractions, no leaking or bleeding.  Dopplers normal today and on last US 5/28.   MVP 4.2cm, cephalic, reactive NST.   Patient was going to go to [place] and [place] for work this month, discussed I do not recommend out of state travel at this point in pregnancy.   She has broken her tailbone 4x (4th time was during childbirth). Worried that will happen again. Son was 5lb 12oz. Planning epidural.   Discussed if dopplers remain normal, IOL for IUGR recommended 38-39w.   GBS collected today.  Reviewed s/sx labor, kick counts.  RTC weekly.  Arlen Fox MD

## 2021-06-05 NOTE — PATIENT INSTRUCTIONS
[email or web address]

## 2021-06-08 ENCOUNTER — HOSPITAL ENCOUNTER (OUTPATIENT)
Dept: ULTRASOUND IMAGING | Facility: CLINIC | Age: 41
End: 2021-06-08
Attending: OBSTETRICS & GYNECOLOGY
Payer: COMMERCIAL

## 2021-06-08 ENCOUNTER — OFFICE VISIT (OUTPATIENT)
Dept: MATERNAL FETAL MEDICINE | Facility: CLINIC | Age: 41
End: 2021-06-08
Attending: OBSTETRICS & GYNECOLOGY
Payer: COMMERCIAL

## 2021-06-08 DIAGNOSIS — O36.5990 PREGNANCY AFFECTED BY FETAL GROWTH RESTRICTION: ICD-10-CM

## 2021-06-08 DIAGNOSIS — O36.5990 PREGNANCY AFFECTED BY FETAL GROWTH RESTRICTION: Primary | ICD-10-CM

## 2021-06-08 PROCEDURE — 76815 OB US LIMITED FETUS(S): CPT

## 2021-06-08 PROCEDURE — 76815 OB US LIMITED FETUS(S): CPT | Mod: 26 | Performed by: OBSTETRICS & GYNECOLOGY

## 2021-06-08 PROCEDURE — 59025 FETAL NON-STRESS TEST: CPT | Mod: 26 | Performed by: OBSTETRICS & GYNECOLOGY

## 2021-06-08 PROCEDURE — 76820 UMBILICAL ARTERY ECHO: CPT

## 2021-06-08 PROCEDURE — 59025 FETAL NON-STRESS TEST: CPT

## 2021-06-08 PROCEDURE — 76820 UMBILICAL ARTERY ECHO: CPT | Mod: 26 | Performed by: OBSTETRICS & GYNECOLOGY

## 2021-06-08 NOTE — PROGRESS NOTES
"Please see \"Imaging\" tab under \"Chart Review\" for details of today's US at the [facility].    Roderick Diaz MD  Maternal-Fetal Medicine      "

## 2021-06-11 ENCOUNTER — HOSPITAL ENCOUNTER (OUTPATIENT)
Dept: ULTRASOUND IMAGING | Facility: CLINIC | Age: 41
End: 2021-06-11
Attending: OBSTETRICS & GYNECOLOGY
Payer: COMMERCIAL

## 2021-06-11 ENCOUNTER — OFFICE VISIT (OUTPATIENT)
Dept: MATERNAL FETAL MEDICINE | Facility: CLINIC | Age: 41
End: 2021-06-11
Attending: OBSTETRICS & GYNECOLOGY
Payer: COMMERCIAL

## 2021-06-11 ENCOUNTER — PRENATAL OFFICE VISIT (OUTPATIENT)
Dept: OBGYN | Facility: CLINIC | Age: 41
End: 2021-06-11
Attending: OBSTETRICS & GYNECOLOGY
Payer: COMMERCIAL

## 2021-06-11 VITALS
SYSTOLIC BLOOD PRESSURE: 104 MMHG | DIASTOLIC BLOOD PRESSURE: 60 MMHG | TEMPERATURE: 99 F | WEIGHT: 146 LBS | BODY MASS INDEX: 22.87 KG/M2 | HEART RATE: 86 BPM

## 2021-06-11 DIAGNOSIS — O36.5990 PREGNANCY AFFECTED BY FETAL GROWTH RESTRICTION: Primary | ICD-10-CM

## 2021-06-11 DIAGNOSIS — O36.5990 PREGNANCY AFFECTED BY FETAL GROWTH RESTRICTION: ICD-10-CM

## 2021-06-11 DIAGNOSIS — O09.523 MULTIGRAVIDA OF ADVANCED MATERNAL AGE IN THIRD TRIMESTER: Primary | ICD-10-CM

## 2021-06-11 PROCEDURE — 76815 OB US LIMITED FETUS(S): CPT

## 2021-06-11 PROCEDURE — 76815 OB US LIMITED FETUS(S): CPT | Mod: 26 | Performed by: OBSTETRICS & GYNECOLOGY

## 2021-06-11 PROCEDURE — 59025 FETAL NON-STRESS TEST: CPT | Mod: 26 | Performed by: OBSTETRICS & GYNECOLOGY

## 2021-06-11 PROCEDURE — 76820 UMBILICAL ARTERY ECHO: CPT | Mod: 26 | Performed by: OBSTETRICS & GYNECOLOGY

## 2021-06-11 PROCEDURE — 99207 PR PRENATAL VISIT: CPT | Performed by: OBSTETRICS & GYNECOLOGY

## 2021-06-11 PROCEDURE — 59025 FETAL NON-STRESS TEST: CPT

## 2021-06-11 NOTE — PROGRESS NOTES
36w5d  Active fetal movement. No leaking or bleeding. No contractions.   US today for intermittent elevated dopplers, cephalic, MVP 5.9cm, reactive NST.  Really does not want IOL at 37 weeks, does not want to schedule today, would prefer baby come on her own, would consider IOL at 38 weeks. US with MFM after appt today. Discussed recommendations for timing of delivery depends on dopplers, BPPs. She cancelled out of state travel plans. Planning epidural. Has OTC meds at home. Reviewed s/sx labor, kick counts.  Arlen Fox MD    
Statement Selected

## 2021-06-17 ENCOUNTER — HOSPITAL ENCOUNTER (OUTPATIENT)
Dept: ULTRASOUND IMAGING | Facility: CLINIC | Age: 41
End: 2021-06-17
Attending: OBSTETRICS & GYNECOLOGY
Payer: COMMERCIAL

## 2021-06-17 ENCOUNTER — PRENATAL OFFICE VISIT (OUTPATIENT)
Dept: OBGYN | Facility: CLINIC | Age: 41
End: 2021-06-17
Payer: COMMERCIAL

## 2021-06-17 ENCOUNTER — OFFICE VISIT (OUTPATIENT)
Dept: MATERNAL FETAL MEDICINE | Facility: CLINIC | Age: 41
End: 2021-06-17
Attending: OBSTETRICS & GYNECOLOGY
Payer: COMMERCIAL

## 2021-06-17 VITALS
BODY MASS INDEX: 23.24 KG/M2 | DIASTOLIC BLOOD PRESSURE: 69 MMHG | HEART RATE: 89 BPM | SYSTOLIC BLOOD PRESSURE: 116 MMHG | WEIGHT: 148.4 LBS

## 2021-06-17 DIAGNOSIS — O36.5990 PREGNANCY AFFECTED BY FETAL GROWTH RESTRICTION: ICD-10-CM

## 2021-06-17 DIAGNOSIS — O09.523 MULTIGRAVIDA OF ADVANCED MATERNAL AGE IN THIRD TRIMESTER: Primary | ICD-10-CM

## 2021-06-17 PROCEDURE — 76816 OB US FOLLOW-UP PER FETUS: CPT

## 2021-06-17 PROCEDURE — 99207 PR PRENATAL VISIT: CPT | Performed by: OBSTETRICS & GYNECOLOGY

## 2021-06-17 PROCEDURE — 59025 FETAL NON-STRESS TEST: CPT | Mod: 26 | Performed by: OBSTETRICS & GYNECOLOGY

## 2021-06-17 PROCEDURE — 76816 OB US FOLLOW-UP PER FETUS: CPT | Mod: 26 | Performed by: OBSTETRICS & GYNECOLOGY

## 2021-06-17 PROCEDURE — 59025 FETAL NON-STRESS TEST: CPT

## 2021-06-17 PROCEDURE — 76820 UMBILICAL ARTERY ECHO: CPT | Mod: 26 | Performed by: OBSTETRICS & GYNECOLOGY

## 2021-06-17 RX ORDER — OXYTOCIN 10 [USP'U]/ML
10 INJECTION, SOLUTION INTRAMUSCULAR; INTRAVENOUS
Status: CANCELLED | OUTPATIENT
Start: 2021-06-17

## 2021-06-17 RX ORDER — ONDANSETRON 2 MG/ML
4 INJECTION INTRAMUSCULAR; INTRAVENOUS EVERY 6 HOURS PRN
Status: CANCELLED | OUTPATIENT
Start: 2021-06-17

## 2021-06-17 RX ORDER — FENTANYL CITRATE 50 UG/ML
50-100 INJECTION, SOLUTION INTRAMUSCULAR; INTRAVENOUS
Status: CANCELLED | OUTPATIENT
Start: 2021-06-17

## 2021-06-17 RX ORDER — NALOXONE HYDROCHLORIDE 0.4 MG/ML
0.4 INJECTION, SOLUTION INTRAMUSCULAR; INTRAVENOUS; SUBCUTANEOUS
Status: CANCELLED | OUTPATIENT
Start: 2021-06-17

## 2021-06-17 RX ORDER — CHLORAL HYDRATE 500 MG
CAPSULE ORAL
COMMUNITY
End: 2023-10-09

## 2021-06-17 RX ORDER — FERROUS SULFATE 325(65) MG
325 TABLET ORAL
COMMUNITY
Start: 2019-10-30 | End: 2023-10-09

## 2021-06-17 RX ORDER — ACETAMINOPHEN 325 MG/1
650 TABLET ORAL EVERY 4 HOURS PRN
Status: CANCELLED | OUTPATIENT
Start: 2021-06-17

## 2021-06-17 RX ORDER — CARBOPROST TROMETHAMINE 250 UG/ML
250 INJECTION, SOLUTION INTRAMUSCULAR
Status: CANCELLED | OUTPATIENT
Start: 2021-06-17

## 2021-06-17 RX ORDER — SODIUM CHLORIDE, SODIUM LACTATE, POTASSIUM CHLORIDE, CALCIUM CHLORIDE 600; 310; 30; 20 MG/100ML; MG/100ML; MG/100ML; MG/100ML
INJECTION, SOLUTION INTRAVENOUS CONTINUOUS
Status: CANCELLED | OUTPATIENT
Start: 2021-06-17

## 2021-06-17 RX ORDER — METHYLERGONOVINE MALEATE 0.2 MG/ML
200 INJECTION INTRAVENOUS
Status: CANCELLED | OUTPATIENT
Start: 2021-06-17

## 2021-06-17 RX ORDER — IBUPROFEN 200 MG
800 TABLET ORAL
Status: CANCELLED | OUTPATIENT
Start: 2021-06-17

## 2021-06-17 RX ORDER — OXYTOCIN/0.9 % SODIUM CHLORIDE 30/500 ML
100-340 PLASTIC BAG, INJECTION (ML) INTRAVENOUS CONTINUOUS PRN
Status: CANCELLED | OUTPATIENT
Start: 2021-06-17

## 2021-06-17 RX ORDER — OXYCODONE AND ACETAMINOPHEN 5; 325 MG/1; MG/1
1 TABLET ORAL
Status: CANCELLED | OUTPATIENT
Start: 2021-06-17

## 2021-06-17 RX ORDER — LIDOCAINE 40 MG/G
CREAM TOPICAL
Status: CANCELLED | OUTPATIENT
Start: 2021-06-17

## 2021-06-17 RX ORDER — NALOXONE HYDROCHLORIDE 0.4 MG/ML
0.2 INJECTION, SOLUTION INTRAMUSCULAR; INTRAVENOUS; SUBCUTANEOUS
Status: CANCELLED | OUTPATIENT
Start: 2021-06-17

## 2021-06-17 NOTE — PROGRESS NOTES
Doing well.   Good fetal movement.   Reports ultrasound showed EFW just under 10th %ile and dopplers and monitoring were normal. No report available yet.   Aware that IOL at 38 weeks is recommended and she declines. She is going to continue monitoring ultrasounds and fetal movement and wait for onset of labor.   RTC 1 week    GBS: Negative  Hemoglobin   Date Value Ref [facility] Status   06/04/2021 11.7 11.7 - 15.7 g/dL Final   ]    Breast pump rx: has pump already  Labor orders: signed and held  Birth plan: epidural - broke her tailbone last delivery. Narcotic pain medication makes her sick.   Length of stay: discussed  Disability paperwork: NA  Resident involvement: discussed and agrees.

## 2021-06-17 NOTE — Clinical Note
Hi,  I saw Estrellita today. She is still declining induction. Is she the one who doesn't want Adrien?  Did you look ahead at call shifts after 38 weeks?  She is seeing [facility] next week after ultrasound - you weren't in clinic on either of the days she has ultrasound scheduled.  Kirsten

## 2021-06-22 ENCOUNTER — OFFICE VISIT (OUTPATIENT)
Dept: MATERNAL FETAL MEDICINE | Facility: CLINIC | Age: 41
End: 2021-06-22
Attending: OBSTETRICS & GYNECOLOGY
Payer: COMMERCIAL

## 2021-06-22 ENCOUNTER — HOSPITAL ENCOUNTER (OUTPATIENT)
Dept: ULTRASOUND IMAGING | Facility: CLINIC | Age: 41
End: 2021-06-22
Attending: OBSTETRICS & GYNECOLOGY
Payer: COMMERCIAL

## 2021-06-22 DIAGNOSIS — O36.5990 PREGNANCY AFFECTED BY FETAL GROWTH RESTRICTION: ICD-10-CM

## 2021-06-22 PROCEDURE — 59025 FETAL NON-STRESS TEST: CPT

## 2021-06-22 PROCEDURE — 76815 OB US LIMITED FETUS(S): CPT | Mod: 26 | Performed by: OBSTETRICS & GYNECOLOGY

## 2021-06-22 PROCEDURE — 76820 UMBILICAL ARTERY ECHO: CPT

## 2021-06-22 PROCEDURE — 59025 FETAL NON-STRESS TEST: CPT | Mod: 26 | Performed by: OBSTETRICS & GYNECOLOGY

## 2021-06-22 PROCEDURE — 76820 UMBILICAL ARTERY ECHO: CPT | Mod: 26 | Performed by: OBSTETRICS & GYNECOLOGY

## 2021-06-25 ENCOUNTER — OFFICE VISIT (OUTPATIENT)
Dept: MATERNAL FETAL MEDICINE | Facility: CLINIC | Age: 41
End: 2021-06-25
Attending: OBSTETRICS & GYNECOLOGY
Payer: COMMERCIAL

## 2021-06-25 ENCOUNTER — HOSPITAL ENCOUNTER (OUTPATIENT)
Dept: ULTRASOUND IMAGING | Facility: CLINIC | Age: 41
End: 2021-06-25
Attending: OBSTETRICS & GYNECOLOGY
Payer: COMMERCIAL

## 2021-06-25 ENCOUNTER — PRENATAL OFFICE VISIT (OUTPATIENT)
Dept: OBGYN | Facility: CLINIC | Age: 41
End: 2021-06-25
Payer: COMMERCIAL

## 2021-06-25 VITALS
SYSTOLIC BLOOD PRESSURE: 105 MMHG | WEIGHT: 149.3 LBS | HEART RATE: 78 BPM | DIASTOLIC BLOOD PRESSURE: 63 MMHG | HEIGHT: 67 IN | TEMPERATURE: 98 F | BODY MASS INDEX: 23.43 KG/M2

## 2021-06-25 DIAGNOSIS — O36.5990 PREGNANCY AFFECTED BY FETAL GROWTH RESTRICTION: ICD-10-CM

## 2021-06-25 DIAGNOSIS — O36.5990 PREGNANCY AFFECTED BY FETAL GROWTH RESTRICTION: Primary | ICD-10-CM

## 2021-06-25 DIAGNOSIS — O09.523 MULTIGRAVIDA OF ADVANCED MATERNAL AGE IN THIRD TRIMESTER: Primary | ICD-10-CM

## 2021-06-25 PROCEDURE — 76815 OB US LIMITED FETUS(S): CPT | Mod: 26 | Performed by: OBSTETRICS & GYNECOLOGY

## 2021-06-25 PROCEDURE — 59025 FETAL NON-STRESS TEST: CPT | Mod: 26 | Performed by: OBSTETRICS & GYNECOLOGY

## 2021-06-25 PROCEDURE — 59025 FETAL NON-STRESS TEST: CPT

## 2021-06-25 PROCEDURE — 76815 OB US LIMITED FETUS(S): CPT

## 2021-06-25 PROCEDURE — 99207 PR PRENATAL VISIT: CPT | Performed by: OBSTETRICS & GYNECOLOGY

## 2021-06-25 PROCEDURE — 76820 UMBILICAL ARTERY ECHO: CPT | Mod: 26 | Performed by: OBSTETRICS & GYNECOLOGY

## 2021-06-25 ASSESSMENT — MIFFLIN-ST. JEOR: SCORE: 1389.85

## 2021-06-25 NOTE — PROGRESS NOTES
38w5d feeling good, a little tired.  Lots of mvmt.  BPP 8/8 today.  Offered IOL at any time and she prefers to await spontaneous labor.  Has discussed with multiple providers and comfortable with her decision.  Plan for continued weekly visits until delivery.   Labor instructions and kick counts reviewed. jlp

## 2021-06-25 NOTE — PROGRESS NOTES
"Please see \"Imaging\" tab under \"Chart Review\" for details of today's US at the [facility].    Roderick Diaz MD  Maternal-Fetal Medicine      "

## 2021-06-25 NOTE — PROGRESS NOTES
"Ultrasound report with recommendations and images available under \"imaging\" tab in EPIC.     Jin Vilchis MD  Maternal Fetal Medicine    "

## 2021-06-29 ENCOUNTER — OFFICE VISIT (OUTPATIENT)
Dept: MATERNAL FETAL MEDICINE | Facility: CLINIC | Age: 41
End: 2021-06-29
Attending: OBSTETRICS & GYNECOLOGY
Payer: COMMERCIAL

## 2021-06-29 ENCOUNTER — HOSPITAL ENCOUNTER (OUTPATIENT)
Dept: ULTRASOUND IMAGING | Facility: CLINIC | Age: 41
End: 2021-06-29
Attending: OBSTETRICS & GYNECOLOGY
Payer: COMMERCIAL

## 2021-06-29 ENCOUNTER — PRENATAL OFFICE VISIT (OUTPATIENT)
Dept: OBGYN | Facility: CLINIC | Age: 41
End: 2021-06-29
Payer: COMMERCIAL

## 2021-06-29 VITALS
BODY MASS INDEX: 23.49 KG/M2 | OXYGEN SATURATION: 99 % | SYSTOLIC BLOOD PRESSURE: 102 MMHG | HEART RATE: 99 BPM | WEIGHT: 150 LBS | DIASTOLIC BLOOD PRESSURE: 62 MMHG

## 2021-06-29 DIAGNOSIS — O36.5990 PREGNANCY AFFECTED BY FETAL GROWTH RESTRICTION: ICD-10-CM

## 2021-06-29 DIAGNOSIS — O36.5990 PREGNANCY AFFECTED BY FETAL GROWTH RESTRICTION: Primary | ICD-10-CM

## 2021-06-29 DIAGNOSIS — O09.529 SUPERVISION OF HIGH-RISK PREGNANCY OF ELDERLY MULTIGRAVIDA: Primary | ICD-10-CM

## 2021-06-29 PROCEDURE — 76820 UMBILICAL ARTERY ECHO: CPT | Mod: 26 | Performed by: OBSTETRICS & GYNECOLOGY

## 2021-06-29 PROCEDURE — 59025 FETAL NON-STRESS TEST: CPT

## 2021-06-29 PROCEDURE — 59025 FETAL NON-STRESS TEST: CPT | Mod: 26 | Performed by: OBSTETRICS & GYNECOLOGY

## 2021-06-29 PROCEDURE — 99207 PR PRENATAL VISIT: CPT | Performed by: OBSTETRICS & GYNECOLOGY

## 2021-06-29 PROCEDURE — 76815 OB US LIMITED FETUS(S): CPT

## 2021-06-29 PROCEDURE — 76815 OB US LIMITED FETUS(S): CPT | Mod: 26 | Performed by: OBSTETRICS & GYNECOLOGY

## 2021-06-29 NOTE — PROGRESS NOTES
"Please see \"Imaging\" tab under \"Chart Review\" for details of today's US.    Dot Bermudez, DO    "

## 2021-06-29 NOTE — PROGRESS NOTES
39w2d  Patient presents following  testing at maternal-fetal medicine, which she reports was reassuring.  Ready for baby to come, but denies any signs or symptoms of labor.  Notes normal fetal movement.  Aware of recommendation for induction of labor given fetal growth restriction, but she continues to decline.  Discussed the importance of monitoring baby's movement in this situation.  Clarified that she would agree to induction of labor 1 week after the due date if she was undelivered at that point.  Offered to check her cervix today and strip her membranes, if able, and she declined.  RTC 1w.  Arlen Lind MD

## 2021-07-02 ENCOUNTER — HOSPITAL ENCOUNTER (OUTPATIENT)
Facility: CLINIC | Age: 41
End: 2021-07-02
Admitting: OBSTETRICS & GYNECOLOGY
Payer: COMMERCIAL

## 2021-07-02 ENCOUNTER — NURSE TRIAGE (OUTPATIENT)
Dept: NURSING | Facility: CLINIC | Age: 41
End: 2021-07-02

## 2021-07-02 ENCOUNTER — HOSPITAL ENCOUNTER (OUTPATIENT)
Facility: CLINIC | Age: 41
Discharge: HOME | End: 2021-07-02
Attending: OBSTETRICS & GYNECOLOGY | Admitting: OBSTETRICS & GYNECOLOGY
Payer: COMMERCIAL

## 2021-07-02 VITALS
HEART RATE: 93 BPM | TEMPERATURE: 98.5 F | RESPIRATION RATE: 16 BRPM | SYSTOLIC BLOOD PRESSURE: 110 MMHG | DIASTOLIC BLOOD PRESSURE: 66 MMHG

## 2021-07-02 PROBLEM — Z36.89 ENCOUNTER FOR TRIAGE IN PREGNANT PATIENT: Status: ACTIVE | Noted: 2021-07-02

## 2021-07-02 PROCEDURE — G0463 HOSPITAL OUTPT CLINIC VISIT: HCPCS

## 2021-07-02 NOTE — PLAN OF CARE
Data: Patient presented to the Birthplace at 0651.   Reason for maternal/fetal assessment per patient is Laboring  . Patient is a . Prenatal record reviewed.      OB History    Para Term  AB Living   2 1 1 0 0 1   SAB TAB Ectopic Multiple Live Births   0 0 0 0 1      # Outcome Date GA Lbr Jalen/2nd Weight Sex Delivery Anes PTL Lv   2 Current            1 Term 20 38w2d  2.608 kg (5 lb 12 oz) M Vag-Spont   ANDRES      Medical History: No past medical history on file.. Gestational Age 39w5d. VSS. Cervix: closed.  Fetal movement present. Patient denies cramping, backache, vaginal discharge, pelvic pressure, UTI symptoms, GI problems, bloody show, vaginal bleeding, edema, headache, visual disturbances, epigastric or URQ pain, abdominal pain, rupture of membranes. Support persons not present.  Action: Verbal consent for EFM. Triage assessment completed. EFM applied for fetal assessment. Uterine assessment by [place]. Fetal assessment: Presumed adequate fetal oxygenation documented (see flow record). Patient education pamphlets given on fetal movement counts and when to call provider. Patient instructed to report change in fetal movement, vaginal leaking of fluid or bleeding, abdominal pain, or any concerns related to the pregnancy to her nurse/physician.   Response: Dr. Youssef, and Dr. Huber informed of patient's arrival. Plan per provider is to discharge to home. Patient verbalized understanding of education and verbalized agreement with plan. Discharged ambulatory at 1045.

## 2021-07-02 NOTE — TELEPHONE ENCOUNTER
"OB Triage Call    Reason for call: Contractions starting in past hour    Assessment: Patient calling reporting contractions starting 1 hour ago waking from sleep. Contractions 4-5 minutes x 1 hour. Fetal movement is positive.  . CHERRIE 21.    Plan: Patient to go to [place] [facility]    Patient plans to deliver at [place]  Patient's primary OB Provider is Ruddy.    Is patient's primary OB from [place]? No- Patient's primary OB provider is a Physician.  Labor and delivery at [place] ([phone]) notified of patient's pending arrival.  Report given to Lalita.      39w5d  Estimated Date of Delivery: 2021        OB History    Para Term  AB Living   2 1 1 0 0 1   SAB TAB Ectopic Multiple Live Births   0 0 0 0 1      # Outcome Date GA Lbr Jalen/2nd Weight Sex Delivery Anes PTL Lv   2 Current            1 Term 20 38w2d  2.608 kg (5 lb 12 oz) M Vag-Spont   ANDRES       Lab Results   Component Value Date    GBS Negative 2021          Arlen Garcia RN 21 5:02 AM  [facility] Nurse Advisor    Reason for Disposition    [1] History of prior delivery (multipara) AND [2] contractions < 10 minutes apart AND [3] present 1 hour    Additional Information    Negative: Passed out (i.e., lost consciousness, collapsed and was not responding)    Negative: Shock suspected (e.g., cold/pale/clammy skin, too weak to stand, low BP, rapid pulse)    Negative: Difficult to awaken or acting confused (e.g., disoriented, slurred speech)    Negative: [1] SEVERE abdominal pain (e.g., excruciating) AND [2] constant AND [3] present > 1 hour    Negative: Severe bleeding (e.g., continuous red blood from vagina, or large blood clots)    Negative: Umbilical cord hanging out of the vagina (shiny, white, curled appearance, \"like telephone cord\")    Negative: Uncontrollable urge to push (i.e., feels like baby is coming out now)    Negative: Can see baby    Negative: Sounds like a " life-threatening emergency to the triager    Negative: Pregnant < 37 weeks (i.e., )    Negative: [1] Uncertain delivery date AND [2] possibly pregnant < 37 weeks (i.e., )    Negative: [1] First baby (primipara) AND [2] contractions < 6 minutes apart  AND [3] present 2 hours    Protocols used: PREGNANCY - LABOR-A-AH

## 2021-07-02 NOTE — DISCHARGE INSTRUCTIONS
Data: Patient presented to the Birthplace at 0651.   Reason for maternal/fetal assessment per patient is Laboring  . Patient is a . Prenatal record reviewed.      OB History    Para Term  AB Living   2 1 1 0 0 1   SAB TAB Ectopic Multiple Live Births   0 0 0 0 1      # Outcome Date GA Lbr Jalen/2nd Weight Sex Delivery Anes PTL Lv   2 Current            1 Term 20 38w2d  2.608 kg (5 lb 12 oz) M Vag-Spont   ANDRES      Medical History: No past medical history on file.. Gestational Age 39w5d. VSS. Cervix: closed.  Fetal movement present. Patient denies cramping, backache, vaginal discharge, pelvic pressure, UTI symptoms, GI problems, bloody show, vaginal bleeding, edema, headache, visual disturbances, epigastric or URQ pain, abdominal pain, rupture of membranes. Support persons not present.  Action: Verbal consent for EFM. Triage assessment completed. EFM applied for fetal assessment. Uterine assessment by [place]. Fetal assessment: Presumed adequate fetal oxygenation documented (see flow record). Patient education pamphlets given on fetal movement counts and when to call provider. Patient instructed to report change in fetal movement, vaginal leaking of fluid or bleeding, abdominal pain, or any concerns related to the pregnancy to her nurse/physician.   Response: Dr. Youssef, and Dr. Huber informed of patient's arrival. Plan per provider is to discharge to home. Patient verbalized understanding of education and verbalized agreement with plan. Discharged ambulatory at 1045.

## 2021-07-02 NOTE — PROGRESS NOTES
Obstetrics Triage Note    Estrellita Caballero  : 1980  MRN: [id number]    CC: contractions    HPI:  Estrellita Caballero is a 40 year old  female at 39w5d by 8w1d US here with complaints of contractions that started this morning around 4 am and were very intense. Ctx have decreased since then, but are still happening frequently. Denies loss of fluid or vaginal bleeding.  Reports + fetal movement. She denies headaches, changes in vision, RUQ pain or worsening edema.  She denies fevers/chills, nausea/vomiting, shortness or breath, chest pain, dysuria or other systemic symptoms.     Pregnancy is complicated by:  - IUGR - EFW 9% ()    OB History    Para Term  AB Living   2 1 1 0 0 1   SAB TAB Ectopic Multiple Live Births   0 0 0 0 1      # Outcome Date GA Lbr Jalen/2nd Weight Sex Delivery Anes PTL Lv   2 Current            1 Term 20 38w2d  2.608 kg (5 lb 12 oz) M Vag-Spont   ANDRES       ROS:  Negative except as mentioned in HPI.    PMH:  No past medical history on file.    PSHx:  Past Surgical History:   Procedure Laterality Date     ENT SURGERY      [place] teeth     ORTHOPEDIC SURGERY      Knne surgery, lateral release       Medications:  Medications Prior to Admission   Medication Sig Dispense Refill Last Dose     ascorbic acid (VITAMIN C) 500 MG CPCR CR capsule    2021 at Unknown time     cholecalciferol 50 MCG ( UT) CAPS    2021 at Unknown time     ferrous sulfate (FEROSUL) 325 (65 Fe) MG tablet Take 325 mg by mouth   2021 at Unknown time     fish oil-omega-3 fatty acids 1000 MG capsule    2021 at Unknown time     Prenatal Vit-Fe Fumarate-FA (PRENATAL MULTIVITAMIN W/IRON) 27-0.8 MG tablet Take 1 tablet by mouth daily   2021 at Unknown time     calcium carbonate (OS-JUANITO) 1500 (600 Ca) MG tablet Take 600 mg by mouth   Unknown at Unknown time       Allergies:  Allergies   Allergen Reactions     Codeine        Physical Exam:   Patient Vitals for the past 24 hrs:   BP Temp  Temp src Pulse Resp   21 0703 110/66 98.5  F (36.9  C) Oral 93 16       Gen: resting comfortably, in NAD  CV: Well-perfused  Pulm: Breathing comfortably on room air  Abd: soft, gravid, non-tender, non-distended    SVE: closed/long/high (0825) > unchanged at 1000  FHT: Baseline 140, mod variability, + accels, no decels  [place]: 3 ctx q10 min > spaced out to 0-1 ctx in 10 min    Labs:  No results found for any visits on 21.    Assessment/Plan:   Estrellita Caballero is a 40 year old female  at 39w5d by 8w1d US, here for rule-out labor.    # Rule-out labor  - Cervix closed/long/high > unchanged after 1.5 hours  - Contractions spaced out  - No LOF    # FWB  - Cat I FHT, reactive and reassuring    - Dispo: to home with follow up in the next week.   - Discussed labor warning signs and indication to return to care.    Staffed with Dr. Huber.    Michelle Youssef MD  OB/GYN, PGY-2  2021, 10:35 AM

## 2021-07-06 ENCOUNTER — NURSE TRIAGE (OUTPATIENT)
Dept: NURSING | Facility: CLINIC | Age: 41
End: 2021-07-06

## 2021-07-06 ENCOUNTER — OFFICE VISIT (OUTPATIENT)
Dept: MATERNAL FETAL MEDICINE | Facility: CLINIC | Age: 41
End: 2021-07-06
Attending: OBSTETRICS & GYNECOLOGY
Payer: COMMERCIAL

## 2021-07-06 ENCOUNTER — HOSPITAL ENCOUNTER (OUTPATIENT)
Dept: ULTRASOUND IMAGING | Facility: CLINIC | Age: 41
End: 2021-07-06
Attending: OBSTETRICS & GYNECOLOGY
Payer: COMMERCIAL

## 2021-07-06 ENCOUNTER — PRENATAL OFFICE VISIT (OUTPATIENT)
Dept: OBGYN | Facility: CLINIC | Age: 41
End: 2021-07-06
Payer: COMMERCIAL

## 2021-07-06 VITALS
WEIGHT: 150 LBS | BODY MASS INDEX: 23.49 KG/M2 | HEART RATE: 80 BPM | DIASTOLIC BLOOD PRESSURE: 68 MMHG | SYSTOLIC BLOOD PRESSURE: 104 MMHG

## 2021-07-06 DIAGNOSIS — O36.5990 PREGNANCY AFFECTED BY FETAL GROWTH RESTRICTION: ICD-10-CM

## 2021-07-06 DIAGNOSIS — O36.5990 PREGNANCY AFFECTED BY FETAL GROWTH RESTRICTION: Primary | ICD-10-CM

## 2021-07-06 PROCEDURE — 99207 PR PRENATAL VISIT: CPT | Performed by: OBSTETRICS & GYNECOLOGY

## 2021-07-06 PROCEDURE — 59025 FETAL NON-STRESS TEST: CPT | Mod: 26 | Performed by: OBSTETRICS & GYNECOLOGY

## 2021-07-06 PROCEDURE — 59426 ANTEPARTUM CARE ONLY: CPT | Performed by: OBSTETRICS & GYNECOLOGY

## 2021-07-06 PROCEDURE — 59025 FETAL NON-STRESS TEST: CPT

## 2021-07-06 PROCEDURE — 76815 OB US LIMITED FETUS(S): CPT | Mod: 26 | Performed by: OBSTETRICS & GYNECOLOGY

## 2021-07-06 PROCEDURE — 76820 UMBILICAL ARTERY ECHO: CPT | Mod: 26 | Performed by: OBSTETRICS & GYNECOLOGY

## 2021-07-06 PROCEDURE — 76820 UMBILICAL ARTERY ECHO: CPT

## 2021-07-06 NOTE — PROGRESS NOTES
40w2d overall feeling well, some ctx over the weekend, was seen in L&D but they stopped and cervix closed.  Good fm.  Had bpp  today with normal dopplers, but PACs noted and Dr. Vilchis strongly recommended IOL no later than tomorrow.  We discussed timing and she has 2 main issues: first, her partner's uncle  and the services are tomorrow evening and Thursday at some point.  She does not want him to miss both.  Also, her childcare for her 17month old live in [place], so she needs to make sure someone is available to watch her child and isn't sure how quickly they can get here.  We discussed various scenarios for timing and my recommendation would be tonight with the likelihood she would deliver by Thursday morning (needs ripening) so her boyfriend could at least go to the Thursday service if he chose to.  I explained that typically unless there is a concern for the FHR tracing, it would take at least 1-2 hours to get the induction started after arrival and then unlikely she would deliver within hours of starting so he could potentially drop her off while awaiting arrival of childcare if they were able to come tonight.  She will go home and discuss with him and call parents about coming down.  I asked her to call L&D as soon as she knows timing of when she will come in, even if tomorrow so they can arrange for room and RN.  I spoke with current on call, Dr. Gutiérrez and she is aware of plan as well.  chacho

## 2021-07-07 ENCOUNTER — HOSPITAL ENCOUNTER (INPATIENT)
Facility: CLINIC | Age: 41
LOS: 1 days | Discharge: HOME HEALTH | End: 2021-07-08
Attending: OBSTETRICS & GYNECOLOGY | Admitting: OBSTETRICS & GYNECOLOGY
Payer: COMMERCIAL

## 2021-07-07 ENCOUNTER — ANESTHESIA (OUTPATIENT)
Dept: OBGYN | Facility: CLINIC | Age: 41
End: 2021-07-07
Payer: COMMERCIAL

## 2021-07-07 ENCOUNTER — ANESTHESIA EVENT (OUTPATIENT)
Dept: OBGYN | Facility: CLINIC | Age: 41
End: 2021-07-07
Payer: COMMERCIAL

## 2021-07-07 ENCOUNTER — NURSE TRIAGE (OUTPATIENT)
Dept: NURSING | Facility: CLINIC | Age: 41
End: 2021-07-07

## 2021-07-07 PROBLEM — O09.523 MULTIGRAVIDA OF ADVANCED MATERNAL AGE IN THIRD TRIMESTER: Status: ACTIVE | Noted: 2021-07-07

## 2021-07-07 PROBLEM — O36.5990 PREGNANCY AFFECTED BY FETAL GROWTH RESTRICTION: Status: ACTIVE | Noted: 2021-07-07

## 2021-07-07 LAB
ABO + RH BLD: NORMAL
ABO + RH BLD: NORMAL
BLD GP AB SCN SERPL QL: NORMAL
BLOOD BANK CMNT PATIENT-IMP: NORMAL
HGB BLD-MCNC: 12 G/DL (ref 11.7–15.7)
LABORATORY COMMENT REPORT: NORMAL
PLATELET # BLD AUTO: 209 10E9/L (ref 150–450)
SARS-COV-2 RNA RESP QL NAA+PROBE: NEGATIVE
SPECIMEN EXP DATE BLD: NORMAL
SPECIMEN SOURCE: NORMAL
T PALLIDUM AB SER QL: NONREACTIVE

## 2021-07-07 PROCEDURE — 250N000009 HC RX 250: Performed by: ANESTHESIOLOGY

## 2021-07-07 PROCEDURE — 88307 TISSUE EXAM BY PATHOLOGIST: CPT | Mod: TC | Performed by: STUDENT IN AN ORGANIZED HEALTH CARE EDUCATION/TRAINING PROGRAM

## 2021-07-07 PROCEDURE — 370N000003 HC ANESTHESIA WARD SERVICE

## 2021-07-07 PROCEDURE — 85018 HEMOGLOBIN: CPT | Performed by: STUDENT IN AN ORGANIZED HEALTH CARE EDUCATION/TRAINING PROGRAM

## 2021-07-07 PROCEDURE — 3E0R3BZ INTRODUCTION OF ANESTHETIC AGENT INTO SPINAL CANAL, PERCUTANEOUS APPROACH: ICD-10-PCS | Performed by: STUDENT IN AN ORGANIZED HEALTH CARE EDUCATION/TRAINING PROGRAM

## 2021-07-07 PROCEDURE — 00HU33Z INSERTION OF INFUSION DEVICE INTO SPINAL CANAL, PERCUTANEOUS APPROACH: ICD-10-PCS | Performed by: STUDENT IN AN ORGANIZED HEALTH CARE EDUCATION/TRAINING PROGRAM

## 2021-07-07 PROCEDURE — 88307 TISSUE EXAM BY PATHOLOGIST: CPT | Mod: 26 | Performed by: PATHOLOGY

## 2021-07-07 PROCEDURE — 86850 RBC ANTIBODY SCREEN: CPT | Performed by: STUDENT IN AN ORGANIZED HEALTH CARE EDUCATION/TRAINING PROGRAM

## 2021-07-07 PROCEDURE — 250N000009 HC RX 250: Performed by: STUDENT IN AN ORGANIZED HEALTH CARE EDUCATION/TRAINING PROGRAM

## 2021-07-07 PROCEDURE — 86900 BLOOD TYPING SEROLOGIC ABO: CPT | Performed by: STUDENT IN AN ORGANIZED HEALTH CARE EDUCATION/TRAINING PROGRAM

## 2021-07-07 PROCEDURE — 258N000003 HC RX IP 258 OP 636: Performed by: ANESTHESIOLOGY

## 2021-07-07 PROCEDURE — 258N000003 HC RX IP 258 OP 636: Performed by: STUDENT IN AN ORGANIZED HEALTH CARE EDUCATION/TRAINING PROGRAM

## 2021-07-07 PROCEDURE — 250N000011 HC RX IP 250 OP 636: Performed by: ANESTHESIOLOGY

## 2021-07-07 PROCEDURE — 87635 SARS-COV-2 COVID-19 AMP PRB: CPT | Performed by: STUDENT IN AN ORGANIZED HEALTH CARE EDUCATION/TRAINING PROGRAM

## 2021-07-07 PROCEDURE — 86901 BLOOD TYPING SEROLOGIC RH(D): CPT | Performed by: STUDENT IN AN ORGANIZED HEALTH CARE EDUCATION/TRAINING PROGRAM

## 2021-07-07 PROCEDURE — 59410 OBSTETRICAL CARE: CPT | Mod: GC | Performed by: OBSTETRICS & GYNECOLOGY

## 2021-07-07 PROCEDURE — 120N000002 HC R&B MED SURG/OB UMMC

## 2021-07-07 PROCEDURE — 85049 AUTOMATED PLATELET COUNT: CPT | Performed by: STUDENT IN AN ORGANIZED HEALTH CARE EDUCATION/TRAINING PROGRAM

## 2021-07-07 PROCEDURE — 250N000013 HC RX MED GY IP 250 OP 250 PS 637: Performed by: STUDENT IN AN ORGANIZED HEALTH CARE EDUCATION/TRAINING PROGRAM

## 2021-07-07 PROCEDURE — 722N000001 HC LABOR CARE VAGINAL DELIVERY SINGLE

## 2021-07-07 PROCEDURE — 86780 TREPONEMA PALLIDUM: CPT | Performed by: STUDENT IN AN ORGANIZED HEALTH CARE EDUCATION/TRAINING PROGRAM

## 2021-07-07 PROCEDURE — 0HQ9XZZ REPAIR PERINEUM SKIN, EXTERNAL APPROACH: ICD-10-PCS | Performed by: OBSTETRICS & GYNECOLOGY

## 2021-07-07 RX ORDER — ONDANSETRON 2 MG/ML
4 INJECTION INTRAMUSCULAR; INTRAVENOUS EVERY 6 HOURS PRN
Status: DISCONTINUED | OUTPATIENT
Start: 2021-07-07 | End: 2021-07-07

## 2021-07-07 RX ORDER — LIDOCAINE HYDROCHLORIDE AND EPINEPHRINE 15; 5 MG/ML; UG/ML
INJECTION, SOLUTION EPIDURAL PRN
Status: DISCONTINUED | OUTPATIENT
Start: 2021-07-07 | End: 2021-07-15

## 2021-07-07 RX ORDER — METHYLERGONOVINE MALEATE 0.2 MG/ML
200 INJECTION INTRAVENOUS
Status: DISCONTINUED | OUTPATIENT
Start: 2021-07-07 | End: 2021-07-07

## 2021-07-07 RX ORDER — OXYTOCIN/0.9 % SODIUM CHLORIDE 30/500 ML
340 PLASTIC BAG, INJECTION (ML) INTRAVENOUS CONTINUOUS PRN
Status: DISCONTINUED | OUTPATIENT
Start: 2021-07-07 | End: 2021-07-09 | Stop reason: HOSPADM

## 2021-07-07 RX ORDER — OXYTOCIN/0.9 % SODIUM CHLORIDE 30/500 ML
1-24 PLASTIC BAG, INJECTION (ML) INTRAVENOUS CONTINUOUS
Status: DISCONTINUED | OUTPATIENT
Start: 2021-07-07 | End: 2021-07-07

## 2021-07-07 RX ORDER — ONDANSETRON 4 MG/1
4 TABLET, ORALLY DISINTEGRATING ORAL EVERY 6 HOURS PRN
Status: DISCONTINUED | OUTPATIENT
Start: 2021-07-07 | End: 2021-07-07

## 2021-07-07 RX ORDER — CITRIC ACID/SODIUM CITRATE 334-500MG
30 SOLUTION, ORAL ORAL ONCE
Status: DISCONTINUED | OUTPATIENT
Start: 2021-07-07 | End: 2021-07-07

## 2021-07-07 RX ORDER — LIDOCAINE 40 MG/G
CREAM TOPICAL
Status: DISCONTINUED | OUTPATIENT
Start: 2021-07-07 | End: 2021-07-07

## 2021-07-07 RX ORDER — MISOPROSTOL 200 UG/1
800 TABLET ORAL
Status: DISCONTINUED | OUTPATIENT
Start: 2021-07-07 | End: 2021-07-09 | Stop reason: HOSPADM

## 2021-07-07 RX ORDER — AMOXICILLIN 250 MG
1 CAPSULE ORAL 2 TIMES DAILY
Status: DISCONTINUED | OUTPATIENT
Start: 2021-07-07 | End: 2021-07-09 | Stop reason: HOSPADM

## 2021-07-07 RX ORDER — OXYTOCIN 10 [USP'U]/ML
10 INJECTION, SOLUTION INTRAMUSCULAR; INTRAVENOUS
Status: DISCONTINUED | OUTPATIENT
Start: 2021-07-07 | End: 2021-07-07

## 2021-07-07 RX ORDER — OXYTOCIN/0.9 % SODIUM CHLORIDE 30/500 ML
100 PLASTIC BAG, INJECTION (ML) INTRAVENOUS CONTINUOUS
Status: DISCONTINUED | OUTPATIENT
Start: 2021-07-07 | End: 2021-07-09 | Stop reason: HOSPADM

## 2021-07-07 RX ORDER — SODIUM CHLORIDE, SODIUM LACTATE, POTASSIUM CHLORIDE, CALCIUM CHLORIDE 600; 310; 30; 20 MG/100ML; MG/100ML; MG/100ML; MG/100ML
INJECTION, SOLUTION INTRAVENOUS CONTINUOUS
Status: DISCONTINUED | OUTPATIENT
Start: 2021-07-07 | End: 2021-07-07

## 2021-07-07 RX ORDER — TRANEXAMIC ACID 10 MG/ML
1 INJECTION, SOLUTION INTRAVENOUS EVERY 30 MIN PRN
Status: DISCONTINUED | OUTPATIENT
Start: 2021-07-07 | End: 2021-07-07

## 2021-07-07 RX ORDER — NALOXONE HYDROCHLORIDE 0.4 MG/ML
0.4 INJECTION, SOLUTION INTRAMUSCULAR; INTRAVENOUS; SUBCUTANEOUS
Status: DISCONTINUED | OUTPATIENT
Start: 2021-07-07 | End: 2021-07-07

## 2021-07-07 RX ORDER — MISOPROSTOL 200 UG/1
TABLET ORAL
Status: DISCONTINUED
Start: 2021-07-07 | End: 2021-07-07 | Stop reason: WASHOUT

## 2021-07-07 RX ORDER — NALOXONE HYDROCHLORIDE 0.4 MG/ML
0.2 INJECTION, SOLUTION INTRAMUSCULAR; INTRAVENOUS; SUBCUTANEOUS
Status: DISCONTINUED | OUTPATIENT
Start: 2021-07-07 | End: 2021-07-07

## 2021-07-07 RX ORDER — MISOPROSTOL 100 UG/1
25 TABLET ORAL
Status: DISCONTINUED | OUTPATIENT
Start: 2021-07-07 | End: 2021-07-07

## 2021-07-07 RX ORDER — BISACODYL 10 MG
10 SUPPOSITORY, RECTAL RECTAL DAILY PRN
Status: DISCONTINUED | OUTPATIENT
Start: 2021-07-09 | End: 2021-07-09 | Stop reason: HOSPADM

## 2021-07-07 RX ORDER — HYDROCORTISONE 2.5 %
CREAM (GRAM) TOPICAL 3 TIMES DAILY PRN
Status: DISCONTINUED | OUTPATIENT
Start: 2021-07-07 | End: 2021-07-09 | Stop reason: HOSPADM

## 2021-07-07 RX ORDER — MODIFIED LANOLIN
OINTMENT (GRAM) TOPICAL
Status: DISCONTINUED | OUTPATIENT
Start: 2021-07-07 | End: 2021-07-09 | Stop reason: HOSPADM

## 2021-07-07 RX ORDER — TRANEXAMIC ACID 10 MG/ML
1 INJECTION, SOLUTION INTRAVENOUS EVERY 30 MIN PRN
Status: DISCONTINUED | OUTPATIENT
Start: 2021-07-07 | End: 2021-07-09 | Stop reason: HOSPADM

## 2021-07-07 RX ORDER — OXYTOCIN 10 [USP'U]/ML
10 INJECTION, SOLUTION INTRAMUSCULAR; INTRAVENOUS
Status: DISCONTINUED | OUTPATIENT
Start: 2021-07-07 | End: 2021-07-09 | Stop reason: HOSPADM

## 2021-07-07 RX ORDER — OXYCODONE AND ACETAMINOPHEN 5; 325 MG/1; MG/1
1 TABLET ORAL
Status: DISCONTINUED | OUTPATIENT
Start: 2021-07-07 | End: 2021-07-07

## 2021-07-07 RX ORDER — EPHEDRINE SULFATE 50 MG/ML
5 INJECTION, SOLUTION INTRAMUSCULAR; INTRAVENOUS; SUBCUTANEOUS
Status: DISCONTINUED | OUTPATIENT
Start: 2021-07-07 | End: 2021-07-07

## 2021-07-07 RX ORDER — AMOXICILLIN 250 MG
2 CAPSULE ORAL 2 TIMES DAILY
Status: DISCONTINUED | OUTPATIENT
Start: 2021-07-07 | End: 2021-07-09 | Stop reason: HOSPADM

## 2021-07-07 RX ORDER — IBUPROFEN 800 MG/1
800 TABLET, FILM COATED ORAL EVERY 6 HOURS PRN
Status: DISCONTINUED | OUTPATIENT
Start: 2021-07-07 | End: 2021-07-09 | Stop reason: HOSPADM

## 2021-07-07 RX ORDER — CARBOPROST TROMETHAMINE 250 UG/ML
250 INJECTION, SOLUTION INTRAMUSCULAR
Status: DISCONTINUED | OUTPATIENT
Start: 2021-07-07 | End: 2021-07-07

## 2021-07-07 RX ORDER — ACETAMINOPHEN 325 MG/1
650 TABLET ORAL EVERY 4 HOURS PRN
Status: DISCONTINUED | OUTPATIENT
Start: 2021-07-07 | End: 2021-07-07

## 2021-07-07 RX ORDER — ACETAMINOPHEN 325 MG/1
650 TABLET ORAL EVERY 4 HOURS PRN
Status: DISCONTINUED | OUTPATIENT
Start: 2021-07-07 | End: 2021-07-09 | Stop reason: HOSPADM

## 2021-07-07 RX ORDER — LIDOCAINE HYDROCHLORIDE 10 MG/ML
INJECTION, SOLUTION EPIDURAL; INFILTRATION; INTRACAUDAL; PERINEURAL
Status: DISCONTINUED
Start: 2021-07-07 | End: 2021-07-07 | Stop reason: HOSPADM

## 2021-07-07 RX ORDER — NALBUPHINE HYDROCHLORIDE 10 MG/ML
2.5-5 INJECTION, SOLUTION INTRAMUSCULAR; INTRAVENOUS; SUBCUTANEOUS EVERY 6 HOURS PRN
Status: DISCONTINUED | OUTPATIENT
Start: 2021-07-07 | End: 2021-07-07

## 2021-07-07 RX ORDER — OXYTOCIN/0.9 % SODIUM CHLORIDE 30/500 ML
100-340 PLASTIC BAG, INJECTION (ML) INTRAVENOUS CONTINUOUS PRN
Status: COMPLETED | OUTPATIENT
Start: 2021-07-07 | End: 2021-07-07

## 2021-07-07 RX ORDER — TERBUTALINE SULFATE 1 MG/ML
0.25 INJECTION, SOLUTION SUBCUTANEOUS
Status: DISCONTINUED | OUTPATIENT
Start: 2021-07-07 | End: 2021-07-07

## 2021-07-07 RX ORDER — FENTANYL CITRATE 50 UG/ML
50-100 INJECTION, SOLUTION INTRAMUSCULAR; INTRAVENOUS
Status: DISCONTINUED | OUTPATIENT
Start: 2021-07-07 | End: 2021-07-07

## 2021-07-07 RX ORDER — IBUPROFEN 800 MG/1
800 TABLET, FILM COATED ORAL
Status: COMPLETED | OUTPATIENT
Start: 2021-07-07 | End: 2021-07-07

## 2021-07-07 RX ADMIN — ACETAMINOPHEN 650 MG: 325 TABLET, FILM COATED ORAL at 20:09

## 2021-07-07 RX ADMIN — IBUPROFEN 800 MG: 800 TABLET, FILM COATED ORAL at 20:09

## 2021-07-07 RX ADMIN — Medication: at 15:27

## 2021-07-07 RX ADMIN — Medication 340 ML/HR: at 18:35

## 2021-07-07 RX ADMIN — SODIUM CHLORIDE, POTASSIUM CHLORIDE, SODIUM LACTATE AND CALCIUM CHLORIDE 500 ML: 600; 310; 30; 20 INJECTION, SOLUTION INTRAVENOUS at 10:54

## 2021-07-07 RX ADMIN — LIDOCAINE HYDROCHLORIDE,EPINEPHRINE BITARTRATE 3 ML: 15; .005 INJECTION, SOLUTION EPIDURAL; INFILTRATION; INTRACAUDAL; PERINEURAL at 15:30

## 2021-07-07 RX ADMIN — SODIUM CHLORIDE, POTASSIUM CHLORIDE, SODIUM LACTATE AND CALCIUM CHLORIDE: 600; 310; 30; 20 INJECTION, SOLUTION INTRAVENOUS at 11:42

## 2021-07-07 RX ADMIN — LIDOCAINE HYDROCHLORIDE 20 ML: 10 INJECTION, SOLUTION EPIDURAL; INFILTRATION; INTRACAUDAL; PERINEURAL at 18:39

## 2021-07-07 RX ADMIN — Medication 1 MILLI-UNITS/MIN: at 12:57

## 2021-07-07 RX ADMIN — SODIUM CHLORIDE, POTASSIUM CHLORIDE, SODIUM LACTATE AND CALCIUM CHLORIDE: 600; 310; 30; 20 INJECTION, SOLUTION INTRAVENOUS at 16:25

## 2021-07-07 RX ADMIN — SODIUM CHLORIDE, POTASSIUM CHLORIDE, SODIUM LACTATE AND CALCIUM CHLORIDE 1000 ML: 600; 310; 30; 20 INJECTION, SOLUTION INTRAVENOUS at 15:07

## 2021-07-07 ASSESSMENT — ACTIVITIES OF DAILY LIVING (ADL)
TOILETING_ISSUES: NO
FALL_HISTORY_WITHIN_LAST_SIX_MONTHS: NO

## 2021-07-07 ASSESSMENT — MIFFLIN-ST. JEOR: SCORE: 1393.03

## 2021-07-07 NOTE — PLAN OF CARE
PT., ARRIVES TO , FOR IOL.  REPORTS LEAKING MODERATE AMT OF CLEAR FLUID, SINCE 0700.  ALSO REPORTS OCCASIONAL MILD CONTRACTIONS.  DENIES VAGINAL BLEEDING.  + FETAL MOVEMENT, PER PT.  PT. INFORMED OF PLAN OF CARE, VERBALIZES UNDERSTANDING.

## 2021-07-07 NOTE — PROGRESS NOTES
"Labor Progress Note     S: Doing well, starting to feel contractions more but still able to rest through them.    O:  BP 98/56   Temp 98.4  F (36.9  C) (Oral)   Resp 20   Ht 1.702 m (5' 7\")   Wt 68 kg (150 lb)   LMP 2020   BMI 23.49 kg/m       General: Well appearing, NAD  SVE: deferred, s/p SROM    FHT: Baseline 135, moderate variability, accelerations present, occasional late decelerations  TOCO: 2 contractions in ten minutes    A/P: 40 year old  at 40w3d by LMP c/w 8w1d US, here for SROM of clear fluid at 0700. She was scheduled for an IOL for FGR with fetal PACs noted on BPP  today at 1200. Pregnancy is also notable for AMA and placenta previa (resolved).      Spontaneous rupture of membranes:  - SROM this AM with minimal change since clinic, augmenting with pitocin given IUGR and SROM; currently at 2 mU/min  - Cervix: 1.5/20/-3; Bishops 2  - Membranes: s/p SROM at 0700   - GBS neg  - Pain: desires epidural  - PPH: no contraindications to meds  - Anticipate       PNC  - Rh pos, Rubella immune, , GBS neg  - Other prenatal labs wnl  - Imagin/17 growth EFW 9%ile, AC 8%ile, placenta posterior                 FWB:   - Overall category I, reactive. Occasional late decels  - Cephalic by BSUS; EFW 7#  - Continuous Fetal Monitoring  - Intrauterine resuscitative measures prn      Marlene García MD  OBGYN PGY-2  2:43 PM 2021          "

## 2021-07-07 NOTE — ANESTHESIA PROCEDURE NOTES
Epidural catheter Procedure Note  Pre-Procedure   Staff -        Anesthesiologist:  Lilibeth Kirkpatrick MD       Resident/Fellow: Ale Lobo MD       Performed By: resident       Location: OB       Procedure Start/Stop Times: 7/7/2021 3:35 PM and 7/7/2021 3:45 PM       Pre-Anesthestic Checklist: patient identified, IV checked, risks and benefits discussed, informed consent, monitors and equipment checked, pre-op evaluation, at physician/surgeon's request and post-op pain management  Timeout:       Correct Patient: Yes        Correct Procedure: Yes        Correct Site: Yes        Correct Position: Yes   Procedure Documentation  Procedure: epidural catheter       Patient Position: sitting       Patient Prep/Sterile Barriers: sterile gloves, mask, patient draped       Skin prep: DuraPrep       Local skin infiltrated with 2 mL of 1% lidocaine.        Insertion Site: L3-4. (midline approach).       Technique: LORT saline        EMILIANA at 5.5 cm.       Needle Type: Touhy needle       Needle Gauge: 18.        Needle Length (Inches): 5        Catheter: 20 G.         Catheter threaded easily.         Threaded 9 cm at skin.         # of attempts: 1 and  # of redirects:  0    Assessment/Narrative         Paresthesias: No.       Test dose of 3 mL lidocaine 1.5% w/ 1:200,000 epinephrine at 15:30.         Test dose negative, 3 minutes after injection, for signs of intravascular, subdural, or intrathecal injection.       Insertion/Infusion Method: LORT saline       Aspiration negative for Heme or CSF via Epidural Catheter.    Medication(s) Administered   0.125% Bupivacaine + 2 mcg/mL Fentanyl via CADD (Epidural), 8 mL  Medication Administration Time: 7/7/2021 3:35 PM

## 2021-07-07 NOTE — ANESTHESIA PREPROCEDURE EVALUATION
Anesthesia Pre-Procedure Evaluation    Patient: Estrellita Caballero   MRN: [id number] : 1980        Preoperative Diagnosis: * No surgery found *   Procedure :      History reviewed. No pertinent past medical history.   Past Surgical History:   Procedure Laterality Date     ENT SURGERY      [place] teeth     ORTHOPEDIC SURGERY      Knne surgery, lateral release      Allergies   Allergen Reactions     Codeine Other (See Comments) and Nausea and Vomiting     Chest tightness      Social History     Tobacco Use     Smoking status: Former Smoker     Smokeless tobacco: Never Used   Substance Use Topics     Alcohol use: Not Currently      Wt Readings from Last 1 Encounters:   21 68 kg (150 lb)        Anesthesia Evaluation   Pt has had prior anesthetic. Type: Regional and OB Labor Epidural.    No history of anesthetic complications       ROS/MED HX  ENT/Pulmonary:  - neg pulmonary ROS     Neurologic:  - neg neurologic ROS     Cardiovascular:  - neg cardiovascular ROS     METS/Exercise Tolerance:     Hematologic:  - neg hematologic  ROS     Musculoskeletal:  - neg musculoskeletal ROS     GI/Hepatic:  - neg GI/hepatic ROS     Renal/Genitourinary:  - neg Renal ROS     Endo:  - neg endo ROS     Psychiatric/Substance Use:  - neg psychiatric ROS     Infectious Disease:  - neg infectious disease ROS     Malignancy:  - neg malignancy ROS     Other: Comment:  at 40w3d           Physical Exam    Airway        Mallampati: II   TM distance: > 3 FB   Neck ROM: full   Mouth opening: > 3 cm    Respiratory Devices and Support         Dental  no notable dental history         Cardiovascular   cardiovascular exam normal          Pulmonary   pulmonary exam normal                OUTSIDE LABS:  CBC:   Lab Results   Component Value Date    HGB 12.0 2021    HGB 11.7 2021    HCT 37.2 2020     2021     2020     BMP: No results found for: NA, POTASSIUM, CHLORIDE, CO2, BUN, CR, GLC  COAGS: No  results found for: PTT, INR, FIBR  POC: No results found for: BGM, HCG, HCGS  HEPATIC: No results found for: ALBUMIN, PROTTOTAL, ALT, AST, GGT, ALKPHOS, BILITOTAL, BILIDIRECT, DAILY  OTHER: No results found for: PH, LACT, A1C, JUANITO, PHOS, MAG, LIPASE, AMYLASE, TSH, T4, T3, CRP, SED    Anesthesia Plan    ASA Status:  2, emergent       Anesthesia Type: Epidural.              Consents    Anesthesia Plan(s) and associated risks, benefits, and realistic alternatives discussed. Questions answered and patient/representative(s) expressed understanding.     - Discussed with:  Patient      - Extended Intubation/Ventilatory Support Discussed: No.      - Patient is DNR/DNI Status: No         Postoperative Care    Pain management: Neuraxial analgesia.        Comments:    Discussed risks of anesthesia including nausea, vomiting, headache, itching, bleeding, infection, cardiopulmonary complications, neurologic complications, and serious complications.              Ale Lobo MD

## 2021-07-07 NOTE — PROGRESS NOTES
"Labor Progress Note     S: Doing well, starting to feel comfortable after epidural placement.     O:  /65   Temp 98.4  F (36.9  C) (Oral)   Resp 24   Ht 1.702 m (5' 7\")   Wt 68 kg (150 lb)   LMP 2020   SpO2 99%   BMI 23.49 kg/m       General: Well appearing, NAD  SVE: 8/-1    FHT: Baseline 125, moderate variability, accelerations present, rare late decelerations  TOCO: 4-5 contractions in ten minutes    A/P: 40 year old  at 40w3d by LMP c/w 8w1d US, here for SROM of clear fluid at 0700. She was scheduled for an IOL for FGR with fetal PACs noted on BPP  today at 1200. Pregnancy is also notable for AMA and placenta previa (resolved).      Spontaneous rupture of membranes:  - SROM this AM with minimal change since clinic, augmenting with pitocin given IUGR and SROM; currently at 2 mU/min > discontinued at this time due to frequent contractions  - Cervix: 1.5/20/-3 > /-1 (1615)   - Membranes: s/p SROM at 0700   - GBS neg  - Pain: s/p epidural   - PPH: no contraindications to meds  - Anticipate       PNC  - Rh pos, Rubella immune, , GBS neg  - Other prenatal labs wnl  - Imagin/17 growth EFW 9%ile, AC 8%ile, placenta posterior                 FWB:   - Overall category I, reactive. Occasional late decels  - Cephalic by BSUS; EFW 7#  - Continuous Fetal Monitoring  - Intrauterine resuscitative measures prn      Marlene García MD  OB/GYN Resident PGY-2  4:24 PM 2021          "

## 2021-07-07 NOTE — PROGRESS NOTES
MD at bedside, patient agrees to SVE.  Patient is finally starting to get pain relief and able to rest.

## 2021-07-07 NOTE — H&P
History and Physical     Estrellita Caballero MRN# [id number]   YOB: 1980 Age: 40 year old      Date of Admission: 21    Primary care provider: No Ref-Primary, Physician      Assessment and Plan:       40 year old  at 40w3d by LMP c/w 8w1d US, here for SROM of clear fluid at 0700. She was scheduled for an IOL for FGR with fetal PACs noted on BPP  today at 1200. Pregnancy is also notable for AMA and placenta previa (resolved).     Spontaneous rupture of membranes:  Patient with SROM of clear fluid this morning.  Feeling regular cramping/ contractions that are slightly increasing in intensity and frequency.  Patient with intermittent late decelerations. Cervix with minimal changed compared to clinic check yesterday. Given IUGR and SROM will plan to start pitocin.    - Cervix: 1.5/20/-3; Bishops 2  - Membranes: s/p SROM at 0700   - GBS neg  - Pain: desires epidural  - PPH: no contraindications to meds  - Anticipate      PNC  - Rh pos, Rubella immune, , GBS neg  - Other prenatal labs wnl  - Imagin/17 growth EFW 9%ile, AC 8%ile, placenta posterior     FWB:   - Cat II tracing, observed for 30 minutes, and given IVF bolus with resolution after maternal position changes. Improved to cat I tracing. Will start pitocin as above.  - Cephalic by BSUS; EFW 7#  - Continuous Fetal Monitoring  - Intrauterine resuscitative measures prn      Patient discussed with Dr. Lis Adrian.     Gilmer Love, MS3      I was present with the medical student who participated in the service and in the documentation of this note.  I have verified the history and personally performed the physical exam and medical decision making, and have verified the content of the note, which accurately reflect my assessment of the patient and the plan of care.     Marlene García MD  OBGYN PGY-2  12:07 PM 2021              HPI:     Estrellita Caballero is a 40 year old  at 40w3d by LMP c/w 8w1d US who presents  today with complaint of leaking fluid at 0700, clear fluid. She had initially been scheduled for IOL for FGR with EFW 9%ile and newly noted PACs on BPP 7/6 at 1200 today. Patient reports that she has been having any contractions approximately once every 20 minutes for the last few days. Denies vaginal bleeding or decreased fetal. Otherwise has been feeling well denies fever, chills, SOB, chest pain, palpitations, N/V, LE swelling/tenderness.  No concerns for headache, vision changes, RUQ or epigastric pain.       Her previous pregnancies were notable for FGR, last baby was 2600 g. Her delivery was complicated by a fractured tailbone. Denies history of postpartum hemorrhage, shoulder dystocia, pre-eclampsia. No history of asthma or high blood pressure.    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           Past Surgical History:     Past Surgical History:   Procedure Laterality Date     ENT SURGERY      [place] teeth     ORTHOPEDIC SURGERY      Knne surgery, lateral release   No prior abdominal surgeries.           Social History:     Denies any alcohol, drug, or other drug use.           Family History:     No known family hx of bleeding or clotting disorder.           Immunizations:     Immunization History   Administered Date(s) Administered     COVID-19,PF,Pfizer 2021, 2021     DTAP (<7y) 1980, 1983,  "05/23/1988     DTaP, Unspecified 1982, 02/16/1983, 05/23/1988     Hep B, Peds or Adolescent 08/10/2000     Historical DTP/aP 04/16/1984     Influenza Vaccine IM > 6 months Valent IIV4 10/29/2019, 01/06/2021     MMR 01/23/1984, 08/07/1995     OPV, trivalent, live 1982, 05/09/1983, 09/13/1983, 04/16/1984, 05/23/1988     Pedvax-hib 09/11/1985     TDAP Vaccine (Adacel) 12/02/2019     Td (Adult), Adsorbed 08/07/1997     Tdap (Adacel,Boostrix) 12/02/2019, 05/26/2021          Allergies:     Allergies   Allergen Reactions     Codeine              Medications:   No current facility-administered medications on file prior to encounter.   ascorbic acid (VITAMIN C) 500 MG CPCR CR capsule,   calcium carbonate (OS-JUANITO) 1500 (600 Ca) MG tablet, Take 600 mg by mouth  cholecalciferol 50 MCG (2000 UT) CAPS,   ferrous sulfate (FEROSUL) 325 (65 Fe) MG tablet, Take 325 mg by mouth  fish oil-omega-3 fatty acids 1000 MG capsule,   Prenatal Vit-Fe Fumarate-FA (PRENATAL MULTIVITAMIN W/IRON) 27-0.8 MG tablet, Take 1 tablet by mouth daily            Review of Systems & Physical Exam:     The Review of Systems is negative other than noted in the HPI      /64   Temp 98.6  F (37  C) (Oral)   Resp 20   Ht 1.702 m (5' 7\")   Wt 68 kg (150 lb)   LMP 09/22/2020   BMI 23.49 kg/m     Gen: Well appearing  CV: RRR, nl S1/S2, no murmurs/clicks/gallops  Lungs: CTAB, non-labored breathing  Abd: Gravid, non-tender, non-distended    Cervix: 1.5/20/-3  Membranes: Grossly ruptured  Presentation: Cephalic by BSUS.  Estimated Fetal Weight: 7# by Leopold's    FHT:  Monitoring External  FHT: Baseline 135 bpm; minimal to moderate variability; no accels; occasional late decelerations  TOCO 1 contraction in 10 minutes         Data:   COVID neg    Hgb 12.0   Plt 209     T&S pending     Physician Attestation   I, Lis Adrian MD, personally examined and evaluated this patient.  I discussed the patient with the resident/fellow and care team, and " agree with the assessment and plan of care as documented in the note of 21.      I personally reviewed vital signs, medications, labs and imaging.    [place]illo findings: Estrellita Caballero is a 40 year old  at 40w3d by LMP c/w 8w1d us admitted with SROM.  Patient reports leaking clear fluid at 0700.  She is feeling her contraction pain increase in intensity and frequency slightly.  On FHT Category 2, reactive.  Category 2 due to intermittent late decelerations.  These resolved with position changes and IV fluid bolus.  Discussed with patient that given these FHT findings and known IUGR we will continue to closely watch labor progress and fetal status and if persistent Category 2 FHT, then would discuss  for delivery.  The patient understands this.  Plan to start pitocin now.  Patient plans epidural for pain control.  All questions answered.    Lis Adrian MD  Date of Service (when I saw the patient): 21

## 2021-07-07 NOTE — TELEPHONE ENCOUNTER
"OB Triage Call    Reason for call: water just broke 10 min ago    Assessment: contractions since Friday  They are about 20 min apart now    No fever  No vaginal bleeding   No color to water  No swelling   No vision changes    Plan: Will go to L&D, but will take their time. Will call back before leaving.     Patient plans to deliver at [place]  Patient's primary OB Provider is Dr. Huber.          40w3d  Estimated Date of Delivery: 2021        OB History    Para Term  AB Living   2 1 1 0 0 1   SAB TAB Ectopic Multiple Live Births   0 0 0 0 1      # Outcome Date GA Lbr Jalen/2nd Weight Sex Delivery Anes PTL Lv   2 Current            1 Term 20 38w2d  2.608 kg (5 lb 12 oz) M Vag-Spont   ANDRES       Lab Results   Component Value Date    GBS Negative 2021          Carlotta Cardona, RN 21 7:20 AM  [facility] Nurse Advisor    Reason for Disposition    Leakage of fluid from vagina    Additional Information    Negative: [1] SEVERE abdominal pain (e.g., excruciating) AND [2] constant AND [3] present > 1 hour    Negative: Severe bleeding (e.g., continuous red blood from vagina, or large blood clots)    Negative: Umbilical cord hanging out of the vagina (shiny, white, curled appearance, \"like telephone cord\")    Negative: Uncontrollable urge to push (i.e., feels like baby is coming out now)    Negative: Can see baby    Negative: Sounds like a life-threatening emergency to the triager    Negative: < 20 weeks pregnant    Negative: Vaginal bleeding    Protocols used: PREGNANCY - RUPTURE OF [id number]      "

## 2021-07-07 NOTE — TELEPHONE ENCOUNTER
Pt  called stating she is able to come in for induction  tomorrow  between 12 noon and 1 pm?     RN read today's note from Dr Mayo Steinberg. RN called labor and delivery at [facility], spoke with charge nurse Kjersti, and she said pt can come in between 12 noon and one pm for induction.      RN called pt and informed to came in between 12 noon and 1 pm for induction and L&D was notified.  She stated okay.       Elmo Mayer RN  [facility] Nurse Advisors

## 2021-07-07 NOTE — PROGRESS NOTES
Dr García consulted Dr Adrian and the plan is to start pitocin 30 minutes after the identified late deceleration.  Patient in agreement with the plan.

## 2021-07-08 VITALS
RESPIRATION RATE: 18 BRPM | HEART RATE: 76 BPM | TEMPERATURE: 98.9 F | BODY MASS INDEX: 23.54 KG/M2 | WEIGHT: 150 LBS | OXYGEN SATURATION: 97 % | DIASTOLIC BLOOD PRESSURE: 65 MMHG | HEIGHT: 67 IN | SYSTOLIC BLOOD PRESSURE: 102 MMHG

## 2021-07-08 LAB — HGB BLD-MCNC: 12 G/DL (ref 11.7–15.7)

## 2021-07-08 PROCEDURE — 36415 COLL VENOUS BLD VENIPUNCTURE: CPT | Performed by: STUDENT IN AN ORGANIZED HEALTH CARE EDUCATION/TRAINING PROGRAM

## 2021-07-08 PROCEDURE — 85018 HEMOGLOBIN: CPT | Performed by: STUDENT IN AN ORGANIZED HEALTH CARE EDUCATION/TRAINING PROGRAM

## 2021-07-08 PROCEDURE — 250N000013 HC RX MED GY IP 250 OP 250 PS 637: Performed by: STUDENT IN AN ORGANIZED HEALTH CARE EDUCATION/TRAINING PROGRAM

## 2021-07-08 RX ORDER — IBUPROFEN 600 MG/1
600 TABLET, FILM COATED ORAL EVERY 6 HOURS PRN
Qty: 60 TABLET | Refills: 0 | Status: SHIPPED | OUTPATIENT
Start: 2021-07-07 | End: 2021-11-15

## 2021-07-08 RX ORDER — ACETAMINOPHEN 325 MG/1
650 TABLET ORAL EVERY 6 HOURS PRN
Qty: 100 TABLET | Refills: 0 | Status: SHIPPED | OUTPATIENT
Start: 2021-07-07 | End: 2021-11-15

## 2021-07-08 RX ORDER — AMOXICILLIN 250 MG
1 CAPSULE ORAL DAILY
Qty: 100 TABLET | Refills: 0 | Status: SHIPPED | OUTPATIENT
Start: 2021-07-07 | End: 2021-11-15

## 2021-07-08 RX ADMIN — IBUPROFEN 800 MG: 800 TABLET, FILM COATED ORAL at 15:01

## 2021-07-08 RX ADMIN — ACETAMINOPHEN 650 MG: 325 TABLET, FILM COATED ORAL at 15:01

## 2021-07-08 RX ADMIN — ACETAMINOPHEN 650 MG: 325 TABLET, FILM COATED ORAL at 00:41

## 2021-07-08 RX ADMIN — IBUPROFEN 800 MG: 800 TABLET, FILM COATED ORAL at 02:15

## 2021-07-08 RX ADMIN — ACETAMINOPHEN 650 MG: 325 TABLET, FILM COATED ORAL at 09:31

## 2021-07-08 RX ADMIN — IBUPROFEN 800 MG: 800 TABLET, FILM COATED ORAL at 08:32

## 2021-07-08 RX ADMIN — ACETAMINOPHEN 650 MG: 325 TABLET, FILM COATED ORAL at 05:29

## 2021-07-08 RX ADMIN — ACETAMINOPHEN 650 MG: 325 TABLET, FILM COATED ORAL at 20:06

## 2021-07-08 NOTE — PLAN OF CARE
Mother and baby transferred to postpartum unit at 2130 via wheelchair after completion of immediate recovery period. Patient oriented to room. Mother and baby bonding well and in satisfactory condition upon transfer.

## 2021-07-08 NOTE — PLAN OF CARE
Vaginal Delivery Note   of viable Female with Dr ESTEPHANIA Adrian and Dr DELILAH Youssef in attendance.  NICU and Nursery CATHY Naranjo RN  present.  Infant with spontaneous cry, to mother's abdomen, dried and stimulated.  APGAR at 1 minute:  8 and APGAR at 5 minutes:  9.  Placenta delivered with out complication, sulcal laceration, with repair, nanette cares provided.  Mother and baby in stable condition. Rafia Guajardo Rn assumed care at 1915

## 2021-07-08 NOTE — PLAN OF CARE
VSS, assessment WDL. Pain managed with ibuprofen and tyelnol. Breastfeeding independently, good latch observed. Up ad ariel voiding freely. Bonding well with baby. No concerns at this time.

## 2021-07-08 NOTE — PROGRESS NOTES
"Post Epidural Anesthesia Follow Up Note    Patient: Estrellita Caballero    Patient location: Postpartum floor.      Procedure(s) Performed:  * No procedures listed *    Anesthesia type: Epidural    Subjective:     Pain is well controlled     Additional ROS:  She does not complain of pruritis at this time. She denies weakness, denies paresthesia, denies difficulties breathing or voiding, denies nausea or vomiting. She is able to ambulate and tolerates regular diet.    Objective:  Vitals stable      Last Vitals: BP 92/62   Pulse 76   Temp 36.9  C (98.5  F) (Oral)   Resp 18   Ht 1.702 m (5' 7\")   Wt 68 kg (150 lb)   LMP 2020   SpO2 97%   Breastfeeding Unknown   BMI 23.49 kg/m      Assessment and plan:   Estrellita Caballero is a 40 year old female  POD #1 s/p epidural. There is no evidence of adverse side effects associated.      Thank you for including us in the care for this patient.    Ale Lobo MD  CA-2  Anesthesia          "

## 2021-07-08 NOTE — LACTATION NOTE
This note was copied from a baby's chart.  Consult for: Second baby breastfeeding well, history of PCOS and infertility.    History:  Vaginal delivery @ 40w3d, AGA infant @ 6# 8 oz. birthweight, less than 24 hours old with several stools and one large void.  Maternal history of AMA @ 41 y/o, dysmenorrhea and menorrhagia with regular cycles, PCOS (though mom says this was not official diagnosis she just had a couple symptoms of it), infertility took multiple rounds of IUI with first baby but this baby spontaneous and unexpected. Had IOL for FGR and fetal PACs, baby was born AGA at 26th percentile. Estrellita shares her first breastfeeding experience was miserable, had painful latch for full 7 months (once in awhile had a good latch that hope would keep her going). Saw 4 or more different LC's in [place] and received differing information and assessments from them - very confusing and frustrating. She pumped a lot to keep up supply, exclusively  until 7 months when her supply plummeted, then found out because she was pregnant. She continued to breastfeed some until 10 months of age.     Breast exam of mom: Soft, symmetric with slightly reddened but intact, everted nipples bilaterally. Even though lactating when conceived Estrellita still noted bilateral breast growth during pregnancy.      Oral exam of baby:  Feels WNL with normal arch, tongue extension spontaneously beyond lower lips and organized suck on finger.     Feeding assessment:  Infant latched on arrival mom report slight pinch and at this point a little shallow as she'd slid off, almost done that side. Mom re-latched second side x2, getting deep and comfortable latch on second try. Infant with intermittent deep jaw pulls, did not listen for swallows till end of feeding. Mom hand expressed and spoon fed about 3.5 mL without difficulty.     Education provided: Reinforced good positioning and latch for comfort, milk transfer and supply. Reviewed anatomy of breast  and infant mouth & likely causes of nipple pain (for last baby), breast compressions prn to enhance milk transfer when slowing down, nutritive vs. non-nutritive suck and how to hear swallows, benefits of skin to skin and feeding on cue, supply and demand, benefits of frequent breast massage & hand expression in early days. Reviewed how to tell when satiated and if getting enough, talked through breastfeeding log with when and who to call if concerns and lactation resources for after discharge (mom already had folder packed away), reinforced success with great feedings currently & encouraged early LC follow up if any concerns develop.  Feeding Plan: Encourage frequent skin to skin, breastfeed on cue 8 to 12 times in 24 hours, hand express after feedings until milk is in & feed back results. Follow up with outpatient LC in clinic as needed if pain or other concerns develop.

## 2021-07-08 NOTE — PLAN OF CARE
Stable postpartum course. VSS. Pain well managed with Tylenol, Ibuprofen and Ice pack. Voiding without difficulty. Up ad ariel. Breastfeeding infant independently on demand. Will continue to monitor closely.

## 2021-07-08 NOTE — PROGRESS NOTES
Post Partum Progress Note  PPD#1  Subjective:  She is resting comfortably in bed this morning.  Pain is improving and well controlled on current medication regimen. Tolerating PO intake.  Lochia present and ***.  Voiding without difficulty.  Passing flatus/BM***. Ambulating without dizziness or difficulty.  She denies headache, changes in vision, nausea/vomiting, chest pain, shortness of breath, RUQ pain, or worsening edema.  She is *** feeding. No other questions or concerns today.    Objective:  Vitals:    21 1850 21 1859 21 1905 21 1935   BP: 101/56 106/60 106/60 107/55   Resp:   16 16   Temp:       TempSrc:       SpO2: 99% 99%  98%   Weight:       Height:           General: NAD. A&Ox3.  CV: Well-perfused  Pulm: Normal respiratory effort.  Abd: Soft, non-tender, non-distended. Fundus is firm and below the umbilicus.  Ext: *** edema. No calf tenderness.    Assessment/Plan:  Estrellita Caballero is a 40 year old  female who is PPD#1 s/p . Pregnancy is notable for AMA and placenta previa (resolved).    # Routine postpartum  - Encourage routine post-operative goals including ambulation and incentive spirometry  - PNC: Rh +. Rubella immune. No intervention indicated.  - Pain: controlled on oral medications  - Heme: Hgb 12 >  > AM Hgb pending***.  If <10 will discharge home with iron.***  - GI: continue anti-emetics and stool softeners as needed.  - : Sanchez in place.*** Voiding spontaneously ***.  - Infant: Stable in ***  - Feeding: ***  - BC: ***    Anticipate discharge to home on P***D#***  Follow-up in clinic in ***6 weeks for routine postpartum visit.    Nasreen Schulz, MS3

## 2021-07-08 NOTE — DISCHARGE SUMMARY
[facility] Discharge Summary    Estrellita Caballero MRN# [id number]   Age: 40 year old YOB: 1980     Date of Admission:  2021  Date of Discharge::  2021  Admitting Physician:  Kirsten Stephenson MD  Discharge Physician:  Laura Jurado MD          Admission Diagnoses:   -IUP at 40w3d  -AMA  -Placenta previa (resolved)         Discharge Diagnosis:     -IUP at 40w3d, now delivered            Procedures:     Procedure(s): -            Medications Prior to Admission:     No medications prior to admission.             Discharge Medications:        Review of your medicines      START taking      Dose / Directions   acetaminophen 325 MG tablet  Commonly known as: TYLENOL  Used for:  (normal spontaneous vaginal delivery)      Dose: 650 mg  Take 2 tablets (650 mg) by mouth every 6 hours as needed for mild pain Start after Delivery.  Quantity: 100 tablet  Refills: 0     ibuprofen 600 MG tablet  Commonly known as: ADVIL/MOTRIN  Used for:  (normal spontaneous vaginal delivery)      Dose: 600 mg  Take 1 tablet (600 mg) by mouth every 6 hours as needed for moderate pain Start after delivery  Quantity: 60 tablet  Refills: 0     senna-docusate 8.6-50 MG tablet  Commonly known as: SENOKOT-S/PERICOLACE  Used for:  (normal spontaneous vaginal delivery)      Dose: 1 tablet  Take 1 tablet by mouth daily Start after delivery.  Quantity: 100 tablet  Refills: 0        CONTINUE these medicines which have NOT CHANGED      Dose / Directions   ascorbic acid 500 MG Cpcr CR capsule  Commonly known as: vitamin C      Refills: 0     calcium carbonate 1500 (600 Ca) MG tablet  Commonly known as: OS-JUANITO      Dose: 600 mg  Take 600 mg by mouth  Refills: 0     cholecalciferol 50 MCG (2000 UT) Caps      Refills: 0     ferrous sulfate 325 (65 Fe) MG tablet  Commonly known as: FEROSUL      Dose: 325 mg  Take 325 mg by mouth  Refills: 0     fish oil-omega-3 fatty acids 1000 MG capsule      Refills: 0      prenatal multivitamin w/iron 27-0.8 MG tablet      Dose: 1 tablet  Take 1 tablet by mouth daily  Refills: 0           Where to get your medicines      These medications were sent to [facility] [place], [place] - [address]  [address] [facility], [facility] [address]    Phone: [phone]     acetaminophen 325 MG tablet    ibuprofen 600 MG tablet    senna-docusate 8.6-50 MG tablet               Consultations:   Anesthesia- epidural          Brief Admission History   Estrellita Caballero is a 40 year old  at 40w3d by LMP c/w 8w1d US who presents today with complaint of leaking fluid at 0700, clear fluid. She had initially been scheduled for IOL for FGR with EFW 9%ile and newly noted PACs on BPP 7/6 at 1200 today. Patient reports that she has been having any contractions approximately once every 20 minutes for the last few days. Denies vaginal bleeding or decreased fetal. Otherwise has been feeling well denies fever, chills, SOB, chest pain, palpitations, N/V, LE swelling/tenderness.  No concerns for headache, vision changes, RUQ or epigastric pain.        Her previous pregnancies were notable for FGR, last baby was 2600 g. Her delivery was complicated by a fractured tailbone. Denies history of postpartum hemorrhage, shoulder dystocia, pre-eclampsia. No history of asthma or high blood pressure.     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  Labor course was uncomplicated. SROM occurred at 0700 and was started on pitocin for augmentation. She was noted to be complete, began pushing, and had a spontaneous vaginal delivery  of a viable female infant in JANET position.  No nuchal cord was noted.  Apgars of 8 and 9 with weight of 2948 grams.  The cord was double clamped after 60 seconds and cut.  A cord segment and cord blood were obtained.  The placenta was then delivered using gentle traction and suprapubic pressure.  The uterus was noted to be firm after IV pitocin and fundal massage.  The perineum was assessed for lacerations and a right sulcal laceration was noted.  The laceration was repaired in standard fashion using 3-0 Vicryl suture.  On final examination of the perineum, the repair was noted to be hemostatic.  Total QBL was 211.  The placenta appeared intact with a 3V umbilical cord.  Dr. Lis Adrian was present for the entire procedure.            Hospital Course:   The patient's hospital course was unremarkable. On discharge, her pain was well controlled. Vaginal bleeding is similar to peak menstrual flow. Voiding without difficulty. Ambulating well and tolerating a normal diet. No fever. Breastfeeding well. Infant is stable. She was discharged on post-partum day #1.    Post-partum hemoglobin:   Hemoglobin   Date Value Ref Range Status   07/08/2021 12.0 11.7 - 15.7 g/dL Final             Discharge Instructions and Follow-Up:     Discharge diet: Regular   Discharge activity: Pelvic rest for 6 weeks including no sexual intercourse, tampons, or douching.    Discharge follow-up: Follow up with your primary OB for a routine postpartum visit in 6 weeks           Discharge Disposition:     Discharged to home in stable condition      Michelle Youssef MD  OB/GYN, PGY-2  07/09/2021, 4:20 AM       Physician Attestation   I, Laura Jurado, have seen and examined this patient.  I have reviewed the above note and agree with discharge plan as documented in the above note.     Laura Jurado MD  Date of Service (when I saw the patient): 7/8/21

## 2021-07-08 NOTE — L&D DELIVERY NOTE
OB Vaginal Delivery Note    Estrellita Caballero MRN# [id number]   Age: 40 year old YOB: 1980     Delivery Note:     Estrellita Caballero is a 40 year old now  who presented at 40w3d who was admitted with SROM.  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  Labor course was uncomplicated. SROM occurred at 0700 and was started on pitocin for augmentation. She was noted to be complete, began pushing, and had a spontaneous vaginal delivery of a viable female infant in JANET position.  No nuchal cord was noted.  Apgars of 8 and 9 with weight of 2948 grams.  The cord was double clamped after 60 seconds and cut.  A cord segment and cord blood were obtained.  The placenta was then delivered using gentle traction and suprapubic pressure.  The uterus was noted to be firm after IV pitocin and fundal massage.  The perineum was assessed for lacerations and a right sulcal laceration was noted.  The laceration was repaired in standard fashion using 3-0 Vicryl suture.  On final examination of the perineum, the repair was noted to be hemostatic.  Total QBL was 211.  The placenta appeared intact with a 3V umbilical cord.  Dr. Lis Acosta was present for the entire procedure.     Michelle Conklin MD  OB/GYN, PGY-2  2021, 12:35 AM       GA: 40w3d  GP:   Labor Complications:    EBL:   mL  Delivery QBL: 211 mL  Delivery Type: Vaginal, Spontaneous   ROM to Delivery Time: (Delivered) Hours: 11 Minutes: 27   Weight: 2.948 kg (6 lb 8 oz)    1 Minute 5 Minute 10 Minute   Apgar Totals: 8   9        LIS ACOSTA;JET BANGURA;MICHELLE CONKLIN     Delivery Details:  Estrellita Caballero, a 40 year old  female delivered a viable infant with apgars of 8  and 9 . Patient was fully dilated and pushing after 3  hours 35  minutes in active labor. Delivery was via vaginal, spontaneous  to a sterile field under epidural  anesthesia. Infant  delivered in vertex  right  occiput  anterior  position. Anterior and posterior shoulders delivered without difficulty. The cord was clamped, cut twice and 3 vessels  were noted. Cord blood was obtained in routine fashion with the following disposition: lab .      Cord complications: none   Placenta delivered at 2021  6:34 PM . Placental disposition was Pathology . Fundal massage performed and fundus found to be firm.     Episiotomy: none    Perineum, vagina, cervix were inspected, and the following lacerations were noted:   Perineal lacerations: none       sulcus laceration: right         Any lacerations were repaired in the usual fashion using 3-0 Vicryl.    Excellent hemostasis was noted. Needle count correct. Infant and patient in delivery room in good and stable condition.        Carmelo Caballero-Estrellita [[id number]]    Labor Event Times    Labor onset date: 21 Onset time:  2:30 PM   Dilation complete date: 21 Complete time:  6:05 PM   Start pushing date/time: 2021 1810      Labor Length    1st Stage (hrs): 3 (min): 35   2nd Stage (hrs): 0 (min): 22   3rd Stage (hrs): 0 (min): 7      Labor Events     labor?: No   steroids: None  Labor Type: Spontaneous  Predominate monitoring during 1st stage: continuous electronic fetal monitoring     Rupture identifier: Sac 1  Rupture date/time: 21 0700   Rupture type: Spontaneous rupture of membranes occuring during spontaneous labor or augmentation  Fluid color: Clear  Fluid odor: Normal     Augmentation: Oxytocin  Indications for augmentation: Ineffective Contraction Pattern  1:1 continuous labor support provided by?: RN Labor partogram used?: no      Delivery/Placenta Date and Time    Delivery Date: 21 Delivery Time:  6:27 PM   Placenta Date/Time: 2021  6:34 PM  Delivering clinician: Michelle Youssef MD   Other personnel present at delivery:  Provider Role   Lis Adrian MD MD [place], [employer]   Michelle Youssef MD  "Resident         Vaginal Counts          Needles Suture Needles Sponges (RETIRED) Instruments   Initial counts 2 0 5    Added to count  2     Relief counts       Final counts             Placed during labor Accounted for at the end of labor   FSE No NA   IUPC No NA   Cervadil No NA         Relief count performed by 2 team members:  Two Team Members   JESSICA Adrian MD              Apgars    Living status: Living   1 Minute 5 Minute 10 Minute 15 Minute 20 Minute   Skin color: 0  1       Heart rate: 2  2       Reflex irritability: 2  2       Muscle tone: 2  2       Respiratory effort: 2  2       Total: 8  9       Apgars assigned by: DIONNE GODFREY-PAULINE     Cord    Vessels: 3 Vessels    Cord Complications: None               Cord Blood Disposition: Lab    Gases Sent?: Yes    Delayed cord clamping?: Yes    Cord Clamping Delay (seconds):  seconds    Stem cell collection?: No       [place] Resuscitation    Methods: None   Care at Delivery: Called by Dr. Adrian to attend delivery secondary to nonreassuring FHT and brief arrythmia. Infant delivered at 1827 on 21 and cried vigorously. Cord clamping was delayed 1 minute per routine. Infant was then taken to warmer, dried, and stimulated. No irregular heart sounds noted on exam. Infant was then returned to Mother and will be transferred to [facility] for routine cares.    HUNTER Godfrey 2021 6:51 PM    Output in Delivery Room: Stool      Measurements    Weight: 6 lb 8 oz Length: 1' 7\"   Head circumference: 33.7 cm    Output in delivery room: Stool     Skin to Skin and Feeding Plan    Skin to skin initiation date/time: 1841    Skin to skin with: Mother  Skin to skin end date/time:     Breastfeeding initiated date/time: 2021     Labor Events and Shoulder Dystocia    Fetal Tracing Prior to Delivery: Category 2  Shoulder dystocia present?: Neg     Delivery (Maternal) (Provider to Complete) ([id number])    Episiotomy: None  Perineal " lacerations: None    Sulcus laceration: right Repaired?: Yes   Repair suture: 3-0 Vicryl  Genital tract inspection done: Pos     Blood Loss  Mother: Estrellita Caballero #[id number]   Start of Mother's Information    IO Blood Loss  21 1430 - 21 0035    Delivery QBL (mL) Hospital Encounter 211 mL    Total  211 mL         End of Mother's Information  Mother: Estrellita Caballero #[id number]          Delivery - Provider to Complete ([id number])    Delivering clinician: Michelle Youssef MD  Attempted Delivery Types (Choose all that apply): Spontaneous Vaginal Delivery  Delivery Type (Choose the 1 that will go to the Birth History): Vaginal, Spontaneous                   Other personnel:  Provider Role   Lis Adrian MD MD [place], [employer] Medical Student   Michelle Youssef MD Resident                Placenta    Date/Time: 2021  6:34 PM  Removal: Spontaneous  Disposition: Pathology           Anesthesia    Method: Epidural  Cervical dilation at placement: 4-7     Analgesic:  BIRTH HISTORY: ANALGESIC   FENTANYL (BULK CHEMICALS - F'S)   BUPIVACAINE HCL IJ             Presentation and Position    Presentation: Vertex    Position: Right Occiput Anterior                 Lis Adrian MD   Physician Attestation   I spent a total of 45 minutes with the patient, personally assisting as the resident performed  and sulcal laceration repair.     Lis Adrian  Date of Service (when I saw the patient): 21

## 2021-07-09 NOTE — PLAN OF CARE
Patient adequate for discharge. Went over discharge instructions; was given opportunity for questions and all questions answered. Discharge medications given. ROP and birth certificate turned in. Discharged home with significant other and infant at 2200.

## 2021-07-09 NOTE — PROGRESS NOTES
[facility] Post-Partum Progress Note     Postpartum day # 1    SUBJECTIVE:   complains of pain in her tailbone. Thinks she broke it again.  Has broken her tailbone already 4 times including with the birth of her first child. Has a donut to sit on.  That does not help much.  Declines referral to physical therapy.   Breast feeding going ok.   Cramping a lot but not bleeding too much.  Wants discharge today.             Physical Exam:     Vitals:    21 0139 21 0535 21 0843 21 1500   BP: 105/60 92/62 111/78 102/65   Pulse: 75 76 71 76   Resp: 16 18 18 18   Temp: 98.4  F (36.9  C) 98.5  F (36.9  C) 98.1  F (36.7  C) 98.9  F (37.2  C)   TempSrc: Oral Oral Oral Oral   SpO2: 97%      Weight:       Height:          Gen:  NAD,   normal respiratory and cardiovascular effort   ABD:  Soft, NT, ND no masses  Uterine fundus is firm, non-tender and at the level of the umbilicus  bilateral LE's: no edema, nontender    Hemoglobin   Date Value Ref Range Status   2021 12.0 11.7 - 15.7 g/dL Final   2021 12.0 11.7 - 15.7 g/dL Final              Assessment and Plan:     PPD # 1  S/p  c/b tailbone pain, possible fracture. Patient familiar with management option for this which is not much.  Declined anything beyond a donut to sit on.    Plan D/C this evening after 24 hrs post delivery.   Patient to follow up in 6 wks for PP visit and discuss contraception at that time.     Laura Jurado MD

## 2021-07-09 NOTE — PROVIDER NOTIFICATION
07/08/21 1919   Provider Notification   Provider Name/Title Adrien   Method of Notification Electronic Page   Request Evaluate-Remote   Notification Reason Other  (D/C Order)     Are you able to put in d/c order for mom? Baby is okay to go home. Thank you!

## 2021-07-14 LAB — COPATH REPORT: NORMAL

## 2021-08-17 ENCOUNTER — OFFICE VISIT (OUTPATIENT)
Dept: FAMILY MEDICINE | Facility: CLINIC | Age: 41
End: 2021-08-17
Payer: COMMERCIAL

## 2021-08-17 VITALS
TEMPERATURE: 97.7 F | OXYGEN SATURATION: 97 % | HEART RATE: 84 BPM | WEIGHT: 128 LBS | DIASTOLIC BLOOD PRESSURE: 64 MMHG | SYSTOLIC BLOOD PRESSURE: 102 MMHG | BODY MASS INDEX: 20.05 KG/M2

## 2021-08-17 DIAGNOSIS — M77.11 RIGHT TENNIS ELBOW: ICD-10-CM

## 2021-08-17 DIAGNOSIS — M25.511 RIGHT SHOULDER PAIN, UNSPECIFIED CHRONICITY: Primary | ICD-10-CM

## 2021-08-17 PROCEDURE — 99204 OFFICE O/P NEW MOD 45 MIN: CPT | Performed by: PHYSICIAN ASSISTANT

## 2021-08-17 NOTE — PROGRESS NOTES
Assessment & Plan       ICD-10-CM    1. Right shoulder pain, unspecified chronicity  M25.511 [facility] Referral     Orthopedic  Referral   2. Right tennis elbow  M77.11             Patient Instructions   Ibuprofen 600 mg three times daily  Use wrist splint for 2 weeks  Follow up with physical therapy   Ortho if needed  Return to clinic for any new or worsening symptoms or go to ER Urgent care in off hours        No follow-ups on file.    LISA [facility] KIM Haro is a 40 year old who presents for the following health issues     HPI     Musculoskeletal problem/pain  Onset/Duration: about 3 months now  Description  Location: shoulder to arm - right  Joint Swelling: no  Redness: no  Pain: YES  Warmth: no  Intensity:  severe  Progression of Symptoms:  worsening and constant  Accompanying signs and symptoms:   Fevers: no  Numbness/tingling/weakness: YES- weakness   History  Trauma to the area: YES- back in highschool, had an injury to the right side before   Recent illness:  no  Previous similar problem: YES- back in highschool   Previous evaluation:  no  Precipitating or alleviating factors:  Aggravating factors include: lifting, exercise and overuse, movement   Therapies tried and outcome: rest/inactivity    She was having a lot of rib pain during pregnancy on the left side so she slept primarily on the right side  This is when it started hurting  She dislocated her arm in high school, did physical therapy, it was suggested she do surgery but didn't     Review of Systems   CONSTITUTIONAL: NEGATIVE for fever, chills, change in weight  ENT/MOUTH: NEGATIVE for ear, mouth and throat problems  RESP: NEGATIVE for significant cough or SOB  CV: NEGATIVE for chest pain, palpitations or peripheral edema      Objective    /64   Pulse 84   Temp 97.7  F (36.5  C) (Temporal)   Wt 58.1 kg (128 lb)   LMP 09/22/2020   SpO2 97%   BMI 20.05 kg/m     Body mass index is 20.05 kg/m .  Physical Exam   GENERAL: healthy, alert and no distress  NECK: no adenopathy, no asymmetry, masses, or scars and thyroid normal to palpation  RESP: lungs clear to auscultation - no rales, rhonchi or wheezes  CV: regular rate and rhythm, normal S1 S2, no S3 or S4, no murmur, click or rub, no peripheral edema and peripheral pulses strong  MS: no gross musculoskeletal defects noted, no edema  Right shoulder: flexion 160 and painful, internal rotation T12, external rotation normal, positive Chamorro, positive anterior apprehension test.  Right arm: tenderness over right wrist flexor tendon, pain with resisted supination and wrist flexion. Normal capillary refill, normal sensation.

## 2021-08-17 NOTE — PATIENT INSTRUCTIONS
Ibuprofen 600 mg three times daily  Use wrist splint for 2 weeks  Follow up with physical therapy   Ortho if needed  Return to clinic for any new or worsening symptoms or go to ER Urgent care in off hours

## 2021-08-30 ENCOUNTER — OFFICE VISIT (OUTPATIENT)
Dept: OBGYN | Facility: CLINIC | Age: 41
End: 2021-08-30
Payer: COMMERCIAL

## 2021-08-30 VITALS
OXYGEN SATURATION: 97 % | DIASTOLIC BLOOD PRESSURE: 65 MMHG | SYSTOLIC BLOOD PRESSURE: 106 MMHG | WEIGHT: 127 LBS | BODY MASS INDEX: 19.89 KG/M2 | HEART RATE: 78 BPM

## 2021-08-30 PROCEDURE — 99207 PR POST PARTUM EXAM: CPT | Performed by: OBSTETRICS & GYNECOLOGY

## 2021-08-30 NOTE — PROGRESS NOTES
Estrellita Caballero is a 40 year old   who is status-post spontaneous vaginal delivery on 21 (Dr. Adrian).  She reports some left posterior perineal discomfort, otherwise no significant problems and is experiencing no abnormal bleeding.  She is breast-feeding.  Birth control plans are declined.         OBJECTIVE:  General appearance was that of a healthy, well-nourished female in [facility].  /65   Pulse 78   Wt 57.6 kg (127 lb)   LMP 2020   SpO2 97%   Breastfeeding Yes   BMI 19.89 kg/m  .    Abdomen was soft, non-tender.      Pelvic exam:  External genitalia appeared normal, well-healed.  She described some discomfort upon palpation of the posterior vulva just to the right of the lower introitus.  The area appeared well-healed, with perhaps a slightly thickened area palpable just beneath the skin.  Cervix and vagina palpably  normal.   Bimanual exam revealed a normal-sized, non-tender uterus and no adnexal masses.    IMP: Normal post-partum exam.    PLAN:  OK to resume normal activity, discussed lubricant with intercourse.  RTC routinely or prn.  She will monitor vulvar symptoms and return as needed, otherwise will plan routine GYN exam early .

## 2021-09-01 ENCOUNTER — MYC MEDICAL ADVICE (OUTPATIENT)
Dept: FAMILY MEDICINE | Facility: CLINIC | Age: 41
End: 2021-09-01

## 2021-09-02 NOTE — TELEPHONE ENCOUNTER
Khadra,    Please see [employer] message.   Pain Management referral cued if agree. Unsure of what would be most helpful.     Thanks,  FLOWER Carl  [facility]

## 2021-09-07 NOTE — TELEPHONE ENCOUNTER
Khadra    See referral cued and also OK for shadi bridges or icey hot if pregnant    Amber Rosas RN   [facility]

## 2021-09-08 ENCOUNTER — MEDICAL CORRESPONDENCE (OUTPATIENT)
Dept: HEALTH INFORMATION MANAGEMENT | Facility: CLINIC | Age: 41
End: 2021-09-08

## 2021-09-09 ENCOUNTER — THERAPY VISIT (OUTPATIENT)
Dept: PHYSICAL THERAPY | Facility: CLINIC | Age: 41
End: 2021-09-09
Attending: PHYSICIAN ASSISTANT
Payer: COMMERCIAL

## 2021-09-09 DIAGNOSIS — M25.511 RIGHT SHOULDER PAIN, UNSPECIFIED CHRONICITY: ICD-10-CM

## 2021-09-09 PROCEDURE — 97140 MANUAL THERAPY 1/> REGIONS: CPT | Mod: GP | Performed by: PHYSICAL THERAPIST

## 2021-09-09 PROCEDURE — 97161 PT EVAL LOW COMPLEX 20 MIN: CPT | Mod: GP | Performed by: PHYSICAL THERAPIST

## 2021-09-09 NOTE — PROGRESS NOTES
Physical Therapy Initial Evaluation  Subjective:  The history is provided by the patient. No  was used.   Therapist Generated HPI Evaluation  Problem details: DOI: May 2021, sleeping on right side .         Type of problem:  Right shoulder.    This is a recurrent condition.    Where condition occurred: at home.  Patient reports pain:  Anterior (right medial scapula: 7/10).  Pain is described as burning, aching and shooting and is constant.  Radiates to: none. Pain timing: none.  Since onset symptoms are gradually worsening.  Associated symptoms:  Loss of strength (loss of motion). Symptoms are exacerbated by carrying (washing hair, carrying objects at shoulder level)  Relieved by: nothing.  Imaging testing: nothing.  Past treatment: none. There was none improvement following previous treatment.  Work activity restrictions: not working presently- /    Barriers include:  None as reported by patient.                        Objective:        Flexibility/Screens:   Negative screens: Cervical                         Cervical/Thoracic Evaluation    AROM:    AROM Thoracic:    Flexion:              Extension:           Rotation:            Left: 45     Right: 40      Headaches: none                         Shoulder Evaluation:  ROM:  AROM:    Flexion:  Left:  168    Right:  90 with catching and pain to 140 degrees  Extension: Left: wnlRight: 15 with pain   Abduction:  Left: wnl   Right:  70 with pain  to 180 and snapping at 130 degrees     Internal Rotation:  Left:  30    Right:  20 with pain   External Rotation:  Left:  105    Right:  90 with pain       Elbow Flexion:  Left:  Wnl    Right:  WNL  Elbow Extension:  Left:  Wnl   Right:  WNL              Strength:    Flexion: Left:5/5   Pain:    Right: 5-/5      Pain:  +  Extension:  Left: 5/5    Pain:    Right: 4+/5     Pain:+  Abduction:  Right: 4+/5      Pain:+  Adduction:  Right: 5/5     Pain:  Internal Rotation:  Left:5/5     Pain:     Right: 4+/5      Pain:+  External Rotation:   Left:5/5     Pain:   Right:4/5      Pain:+        Elbow Flexion:  Left:5/5     Pain:    Right:5-/5      Pain:+    Stability Testing:  not assessed      Special Tests:  not assessed        Mobility Tests:    Glenohumeral anterior left:  Normal  Glenohumeral anterior right:  Hypermobile  Glenohumeral posterior left:  Normal  Glenohumeral posterior right:  Hypomobile  Glenohumeral inferior left:  Normal  Glenohumeral inferior right:  Hypomobile    Acromioclavicular left:  Normal  Acromioclavicular right:  Hypermobile      Sternoclavicular left:  Normal  Sternoclavicular right:  Normal  Scapulohumeral rhythm right:  Hypermobile                                  R Rhomboids, Upper Trapezius, and posterior rotator cuff muscle tightness   General     ROS    Assessment/Plan:    Patient is a 40 year old female with right side shoulder complaints.    Patient has the following significant findings with corresponding treatment plan.                Diagnosis 1:  Right shoulder pain, unspecified chronicity   Pain -  hot/cold therapy, manual therapy, self management, education, directional preference exercise and home program  Decreased ROM/flexibility - manual therapy, therapeutic exercise, therapeutic activity and home program  Decreased joint mobility - manual therapy, therapeutic exercise, therapeutic activity and home program  Decreased strength - therapeutic exercise, therapeutic activities and home program  Impaired muscle performance - neuro re-education and home program  Decreased function - therapeutic activities and home program    Therapy Evaluation Codes:     1) Clinical presentation characteristics are:   Stable/Uncomplicated.  2) Decision-Making    Low complexity using standardized patient assessment instrument and/or measureable assessment of functional outcome.  Cumulative Therapy Evaluation is: Low complexity.    Previous and current functional limitations:  (See Goal  Flow Sheet for this information)    Short term and Long term goals: (See Goal Flow Sheet for this information)     Communication ability:  Patient appears to be able to clearly communicate and understand verbal and written communication and follow directions correctly.  Treatment Explanation - The following has been discussed with the patient:   RX ordered/plan of care  Anticipated outcomes  Possible risks and side effects  This patient would benefit from PT intervention to resume normal activities.   Rehab potential is good.    Frequency:  1 X week, once daily  Duration:  for 6 weeks  Discharge Plan:  Achieve all LTG.  Independent in home treatment program.  Reach maximal therapeutic benefit.    Please refer to the daily flowsheet for treatment today, total treatment time and time spent performing 1:1 timed codes.

## 2021-09-10 NOTE — PROGRESS NOTES
Physical Therapy Initial Evaluation  Subjective:    Patient Health History         Pain is reported as 7/10 on pain scale.  General health as reported by patient is excellent.  Pertinent medical history includes: anemia.     Medical allergies: other. Other medical allergies details: Codeine.   Surgeries include:  Orthopedic surgery. Other surgery history details: Knee.    Current medications:  Anti-inflammatory and other. Other medications details: Ibuprofen.    Current occupation is /.   Primary job tasks include:  Computer work.                                    Objective:  System    Physical Exam    General     ROS    Assessment/Plan:

## 2021-09-20 ENCOUNTER — THERAPY VISIT (OUTPATIENT)
Dept: PHYSICAL THERAPY | Facility: CLINIC | Age: 41
End: 2021-09-20
Payer: COMMERCIAL

## 2021-09-20 DIAGNOSIS — M25.511 ACUTE PAIN OF RIGHT SHOULDER: Primary | ICD-10-CM

## 2021-09-20 PROCEDURE — 97110 THERAPEUTIC EXERCISES: CPT | Mod: GP | Performed by: PHYSICAL THERAPIST

## 2021-09-20 PROCEDURE — 97530 THERAPEUTIC ACTIVITIES: CPT | Mod: GP | Performed by: PHYSICAL THERAPIST

## 2021-09-20 NOTE — PROGRESS NOTES
Impression:   Patient presents with findings consistent to shoulder instability. Emphasized pain free shoulder cuff strengthening and scapular rows. Tape was applied at the end of the session and pt will report back on whether or not it helped. Next: taping with exercises, progress range (pain permitting).    S:  Shoulder has stayed the same. Exercises are painful, but has been able to do them once a day. Pain has been keeping her from sleep.     O:  SHOULDER RANGE OF MOTION  AROM (deg) Ext/IR spinal level   Left T10   Right T7   Flexion: WNL (Painful range from 100-180)  Abduction: WNL (Painful range from 100-180)    PROM (deg) 90-90 ER 90-90 IR   Left 90 pain 40        Seated strength screen Abduction ER (seated, arm by side) IR (seated, arm by side) Flexion   Left   4/5  4/5 4/5 4/5   Right 4/5 pain 4/5 pain 4/5 pain 4/5 pain

## 2021-09-24 ENCOUNTER — THERAPY VISIT (OUTPATIENT)
Dept: PHYSICAL THERAPY | Facility: CLINIC | Age: 41
End: 2021-09-24
Payer: COMMERCIAL

## 2021-09-24 DIAGNOSIS — M25.511 ACUTE PAIN OF RIGHT SHOULDER: ICD-10-CM

## 2021-09-24 PROCEDURE — 97140 MANUAL THERAPY 1/> REGIONS: CPT | Mod: GP | Performed by: PHYSICAL THERAPIST

## 2021-09-24 PROCEDURE — 97530 THERAPEUTIC ACTIVITIES: CPT | Mod: GP | Performed by: PHYSICAL THERAPIST

## 2021-09-24 NOTE — PROGRESS NOTES
Therapist Impression:   Initiated TPR today.  Continue with taping and TPR    GOALS: Lifting infant    NEXT: Continue POC    PTRX: NA    Subjective:  Pain is still bad.  Flared shoulder up today when restraining son.  Reports maybe slightly better than last.  Tape helped a bit.    Objective:  Significant TTP at infraspinatus/teres minor muscle bellies and tendons, rhomboids/middle trapezius, levator scapulae

## 2021-10-04 ENCOUNTER — THERAPY VISIT (OUTPATIENT)
Dept: PHYSICAL THERAPY | Facility: CLINIC | Age: 41
End: 2021-10-04
Payer: COMMERCIAL

## 2021-10-04 DIAGNOSIS — M25.511 ACUTE PAIN OF RIGHT SHOULDER: ICD-10-CM

## 2021-10-04 PROCEDURE — 97110 THERAPEUTIC EXERCISES: CPT | Mod: GP | Performed by: PHYSICAL THERAPIST

## 2021-10-04 PROCEDURE — 97140 MANUAL THERAPY 1/> REGIONS: CPT | Mod: GP | Performed by: PHYSICAL THERAPIST

## 2021-10-04 PROCEDURE — 97530 THERAPEUTIC ACTIVITIES: CPT | Mod: GP | Performed by: PHYSICAL THERAPIST

## 2021-10-04 NOTE — PROGRESS NOTES
Therapist Impression:   Continue taping and TPR.  Added in additional exercises, horiz abd and ceiling punch    GOALS: Lifting infant    NEXT: Continue POC    PTRX: NA    Subjective:  Big difference when tape is on.    Objective:  Significant TTP at infraspinatus/teres minor muscle bellies and tendons, rhomboids/middle trapezius, levator scapulae   SHOULDER RANGE OF MOTION  AROM (deg) Ext/IR spinal level   Left T7   Right T7

## 2021-10-11 ENCOUNTER — THERAPY VISIT (OUTPATIENT)
Dept: PHYSICAL THERAPY | Facility: CLINIC | Age: 41
End: 2021-10-11
Payer: COMMERCIAL

## 2021-10-11 DIAGNOSIS — M25.511 ACUTE PAIN OF RIGHT SHOULDER: ICD-10-CM

## 2021-10-11 PROCEDURE — 97032 APPL MODALITY 1+ESTIM EA 15: CPT | Mod: GP | Performed by: PHYSICAL THERAPIST

## 2021-10-11 PROCEDURE — 97530 THERAPEUTIC ACTIVITIES: CPT | Mod: GP | Performed by: PHYSICAL THERAPIST

## 2021-10-11 NOTE — PROGRESS NOTES
Therapist Impression:   Trial E-Stim, contact MD to discuss Voltaren, consider US    GOALS: Lifting infant    NEXT: Continue POC    PTRX: NA    Subjective:  Not much progress.  Tough to do exercises at end of day due to pain    Objective:  Significant TTP at infraspinatus/teres minor muscle bellies and tendons, rhomboids/middle trapezius, levator scapulae   SHOULDER RANGE OF MOTION  AROM (deg) Ext/IR spinal level   Left T7   Right T7

## 2021-10-15 ENCOUNTER — NURSE TRIAGE (OUTPATIENT)
Dept: NURSING | Facility: CLINIC | Age: 41
End: 2021-10-15

## 2021-10-15 NOTE — TELEPHONE ENCOUNTER
"Headache and runny nose started yesterday and today is having some shortness of breath and chest hurts 2/10 constantly but \"heavier now.\"    Disposition recommeneds ER and patient verbalized understanding and agrees to plan.      Wendy Guerrero RN  [facility]      COVID 19 Nurse Triage Plan/Patient Instructions    Please be aware that novel coronavirus (COVID-19) may be circulating in the community. If you develop symptoms such as fever, cough, or SOB or if you have concerns about the presence of another infection including coronavirus (COVID-19), please contact your health care provider or visit [email or web address].     Disposition/Instructions    ED Visit recommended. Follow protocol based instructions.     Bring Your Own Device:  Please also bring your smart device(s) (smart phones, tablets, laptops) and their charging cables for your personal use and to communicate with your care team during your visit.    Thank you for taking steps to prevent the spread of this virus.  o Limit your contact with others.  o Wear a simple mask to cover your cough.  o Wash your hands well and often.    Resources    [facility]: About COVID-19: [email or web address]    CDC: What to Do If You're Sick: www.cdc.gov/coronavirus/2019-ncov/about/steps-when-sick.html    CDC: Ending Home Isolation: www.cdc.gov/coronavirus/2019-ncov/hcp/disposition-in-home-patients.html     CDC: Caring for Someone: www.cdc.gov/coronavirus/2019-ncov/if-you-are-sick/care-for-someone.html     [facility]: Interim Guidance for Hospital Discharge to Home: www.health.[facility].[place]/diseases/coronavirus/hcp/hospdischarge.pdf    [facility] clinical trials (COVID-19 research studies): [email or web address]-clinical-trials     Below are the COVID-19 hotlines at the [place] Department of Health ([facility]). Interpreters are available.   o For health questions: Call 885-888-5094 or 1-120.135.6058 (7 a.m. to 7 p.m.)  o For " questions about schools and childcare: Call 855-013-2339 or 1-599.493.3554 (7 a.m. to 7 p.m.)       Reason for Disposition    SEVERE or constant chest pain or pressure (Exception: mild central chest pain, present only when coughing)    Additional Information    Negative: SEVERE difficulty breathing (e.g., struggling for each breath, speaks in single words)    Negative: Difficult to awaken or acting confused (e.g., disoriented, slurred speech)    Negative: Bluish (or gray) lips or face now    Negative: Shock suspected (e.g., cold/pale/clammy skin, too weak to stand, low BP, rapid pulse)    Negative: Sounds like a life-threatening emergency to the triager    Protocols used: CORONAVIRUS (COVID-19) DIAGNOSED OR [id number] 8.25.2021

## 2021-10-17 ENCOUNTER — HEALTH MAINTENANCE LETTER (OUTPATIENT)
Age: 41
End: 2021-10-17

## 2021-11-03 ENCOUNTER — THERAPY VISIT (OUTPATIENT)
Dept: PHYSICAL THERAPY | Facility: CLINIC | Age: 41
End: 2021-11-03
Payer: COMMERCIAL

## 2021-11-03 DIAGNOSIS — M25.511 ACUTE PAIN OF RIGHT SHOULDER: ICD-10-CM

## 2021-11-03 PROCEDURE — 97110 THERAPEUTIC EXERCISES: CPT | Mod: GP | Performed by: PHYSICAL THERAPIST

## 2021-11-03 PROCEDURE — 97530 THERAPEUTIC ACTIVITIES: CPT | Mod: GP | Performed by: PHYSICAL THERAPIST

## 2021-11-03 NOTE — PROGRESS NOTES
PROGRESS REPORT    Estrellita has been in therapy from September 9, 2021 to Nov 3, 2021 for treatment of Right shoulder pain, unspecified chronicity .    Therapist Impression:   Estrellita returns to therapy with small improvements.  Her new onset of sharp pain suggests shoulder instability and may be exacerbated due to hormonal changes from pregnancy.  We discussed/reviewed HEP, discussed alternative options such as US, Intelliskin shirts as rigid tape caused burns on her skin that have some residual scarring lift, we did try Kinesioptaping as Estrellita's skin has responded well to this in the past.      GOALS: Lifting infant    NEXT: Continue POC    PTRX: NA    Subjective:  Unable to tape for 3 weeks due to skin burn from tape.  Feels like she can reach better than before.  Still has pain.  Can do exercises more.  Unable to sleep on shoulder, reach across body.  Has intermittent sharp pain where it feels like someone is trying to rip arm out of socket.  Happened about 5 times.  Nothing brings this on.  TENS unit didn't work and will be returning it.    Objective:  None    ASSESSMENT/PLAN  Updated problem list and treatment plan: The encounter diagnosis was Acute pain of right shoulder. Pain - HEP  Decreased ROM/flexibility - HEP  Decreased function - HEP  Decreased strength - HEP  Impaired muscle performance - HEP  Impaired posture - HEP  STG/LTGs have been met or progress has been made towards goals:  None  Assessment of Progress: The patient's condition has potential to improve.  Self Management Plans:  Patient has been instructed in a home treatment program.  Patient  has been instructed in self management of symptoms.  I have re-evaluated this patient and find that the nature, scope, duration and intensity of the therapy is appropriate for the medical condition of the patient.  Estrellita continues to require the following intervention to meet STG and LTG's:  PT    Recommendations:  This patient would benefit from continued therapy.      Frequency:  2 X a month, once daily  Duration:  for 2 months              Please refer to the daily flowsheet for treatment today, total treatment time and time spent performing 1:1 timed codes.

## 2021-11-10 ENCOUNTER — MEDICAL CORRESPONDENCE (OUTPATIENT)
Dept: HEALTH INFORMATION MANAGEMENT | Facility: CLINIC | Age: 41
End: 2021-11-10
Payer: COMMERCIAL

## 2021-11-15 ENCOUNTER — OFFICE VISIT (OUTPATIENT)
Dept: OBGYN | Facility: CLINIC | Age: 41
End: 2021-11-15
Payer: COMMERCIAL

## 2021-11-15 VITALS
HEIGHT: 67 IN | WEIGHT: 119.6 LBS | SYSTOLIC BLOOD PRESSURE: 101 MMHG | DIASTOLIC BLOOD PRESSURE: 59 MMHG | BODY MASS INDEX: 18.77 KG/M2 | HEART RATE: 69 BPM

## 2021-11-15 DIAGNOSIS — N89.8 VAGINAL DISCHARGE: ICD-10-CM

## 2021-11-15 DIAGNOSIS — R10.2 VAGINAL PAIN: ICD-10-CM

## 2021-11-15 DIAGNOSIS — N76.0 BACTERIAL VAGINOSIS: ICD-10-CM

## 2021-11-15 DIAGNOSIS — Z12.4 CERVICAL CANCER SCREENING: ICD-10-CM

## 2021-11-15 DIAGNOSIS — B96.89 BACTERIAL VAGINOSIS: ICD-10-CM

## 2021-11-15 DIAGNOSIS — Z01.419 WELL WOMAN EXAM WITH ROUTINE GYNECOLOGICAL EXAM: Primary | ICD-10-CM

## 2021-11-15 LAB
CLUE CELLS: PRESENT
TRICHOMONAS, WET PREP: ABNORMAL
WBC'S/HIGH POWER FIELD, WET PREP: ABNORMAL
YEAST, WET PREP: ABNORMAL

## 2021-11-15 PROCEDURE — 99213 OFFICE O/P EST LOW 20 MIN: CPT | Mod: 25 | Performed by: OBSTETRICS & GYNECOLOGY

## 2021-11-15 PROCEDURE — 99395 PREV VISIT EST AGE 18-39: CPT | Performed by: OBSTETRICS & GYNECOLOGY

## 2021-11-15 PROCEDURE — G0145 SCR C/V CYTO,THINLAYER,RESCR: HCPCS | Performed by: OBSTETRICS & GYNECOLOGY

## 2021-11-15 PROCEDURE — 87210 SMEAR WET MOUNT SALINE/INK: CPT | Performed by: OBSTETRICS & GYNECOLOGY

## 2021-11-15 PROCEDURE — 87624 HPV HI-RISK TYP POOLED RSLT: CPT | Performed by: OBSTETRICS & GYNECOLOGY

## 2021-11-15 RX ORDER — METRONIDAZOLE 500 MG/1
500 TABLET ORAL 2 TIMES DAILY
Qty: 14 TABLET | Refills: 0 | Status: SHIPPED | OUTPATIENT
Start: 2021-11-15 | End: 2021-11-22

## 2021-11-15 ASSESSMENT — MIFFLIN-ST. JEOR: SCORE: 1250.13

## 2021-11-15 NOTE — PATIENT INSTRUCTIONS
Patient Education     Prevention Guidelines, Women Ages 18 to 39  Screening tests and vaccines are an important part of managing your health. A screening test is done to find possible disorders or diseases in people who don't have any symptoms. The goal is to find a disease early so lifestyle changes can be made and you can be watched more closely to reduce the risk of disease, or to detect it early enough to treat it most effectively. Screening tests are not considered diagnostic, but are used to determine if more testing is needed. Health counseling is essential, too. Below are guidelines for these, for women ages 18 to 39. Talk with your healthcare provider to make sure you re up-to-date on what you need.   Screening Who needs it How often   Alcohol misuse All women in this age group  At routine exams   Blood pressure All women in this age group  Yearly checkup if your blood pressure is normal   Normal blood pressure is less than 120/80 mm Hg   If your blood pressure reading is higher than normal, follow the advice of your healthcare provider    Breast cancer All women in this age group should talk with their healthcare providers about the need for clinical breast exams (CBE)1  Clinical breast exam every 3 years1    Cervical cancer Women ages 21 and older  Women between ages 21 and 29 should have a Pap test every 3 years; women between ages 30 and 65 are advised to have a Pap test plus an HPV test every 5 years    Chlamydia Sexually active women ages 24 and younger, and women at increased risk for infection  Every 3 years if you're at risk or have symptoms    Depression All women in this age group  At routine exams   Diabetes mellitus, type 2  Adults with no symptoms who are overweight or obese and have 1 or more other risk factors for diabetes  At least every 3 years. Also, testing for diabetes during pregnancy after the 24th week.     Gonorrhea Sexually active women at increased risk for infection  At routine  exams   Hepatitis C Anyone at increased risk  At routine exams   HIV All women At routine exams3     Obesity All women in this age group  At routine exams   Syphilis Women at increased risk for infection should talk with their healthcare provider  At routine exams   Tuberculosis Women at increased risk for infection should talk with their healthcare provider  Ask your healthcare provider    Vision All women in this age group  At least 1 complete exam in your 20s, and 2 in your 30s    Vaccine Who needs it How often   Chickenpox (varicella)  All women in this age group who have no record of this infection or vaccine  2 doses; the second dose should be given 4 to 8 weeks after the first dose    Hepatitis A Women at increased risk for infection should talk with their healthcare provider  2 doses given at least 6 months apart    Hepatitis B Women at increased risk for infection should talk with their healthcare provider  3 doses over 6 months; second dose should be given 1 month after the first dose; the third dose should be given at least 2 months after the second dose and at least 4 months after the first dose    Haemophilus influenzae  Type B (HIB)  Women at increased risk for infection should talk with their healthcare provider  1 to 3 doses   Human papillomavirus (HPV)  All women in this age group up to age 26  3 doses; the second dose should be given 1 to 2 months after the first dose and the third dose given 6 months after the first dose    Influenza (flu) All women in this age group  Once a year   Measles, mumps, rubella (MMR)  All women in this age group who have no record of these infections or vaccines  1 or 2 doses   Meningococcal Women at increased risk for infection should talk with their healthcare provider  1 or more doses   Pneumococcal conjugate vaccine (PCV13) and pneumococcal polysaccharide vaccine (PPSV23)  Women at increased risk for infection should talk with their healthcare provider  PCV13: 1  dose ages 19 to 65 (protects against 13 types of pneumococcal bacteria)   PPSV23: 1 to 2 doses through age 64, or 1 dose at 65 or older (protects against 23 types of pneumococcal bacteria)    Tetanus/diphtheria/pertussis (Td/Tdap) booster All women in this age group  Td every 10 years, or a one-time dose of Tdap instead of a Td booster after age 18, then Td every 10 years    Counseling Who needs it How often   BRCA gene mutation testing for breast and ovarian cancer susceptibility  Women with increased risk for having gene mutation  When your risk is known    Breast cancer and chemoprevention  Women at high risk for breast cancer  When your risk is known    Diet and exercise Women who are overweight or obese  When diagnosed, and then at routine exams    Domestic violence Women at the age in which they are able to have children  At routine exams   Sexually transmitted infection prevention  Women who are sexually active  At routine exams   Skin cancer Prevention of skin cancer in fair-skinned adults  At routine exams   Use of tobacco and the health effects it can cause  All women in this age group  Every visit   1 According to the ACS, women ages 20 to 39 years should have a clinical breast exam (CBE) as part of their routine health exam every 3 years. Breast self-exams are an option for women starting in their 20s. But the U.S. Preventive Services Task Force (USPSTF) does not recommend CBE.   2 Those who are 18 years old and not up-to-date on their childhood vaccines should get all appropriate catch-up vaccines recommended by the CDC.   3 The USPSTF recommends that all people ages 15 to 65 years be screened for HIV and those younger or older people at increased risk. The CDC recommends that everyone between the ages of 13 and 64 get tested for HIV at least once as part of routine health care.   Luis last reviewed this educational content on 10/1/2017    0310-2196 [employer]. All rights reserved.  This information is not intended as a substitute for professional medical care. Always follow your healthcare professional's instructions.

## 2021-11-15 NOTE — PROGRESS NOTES
Estrellita is a 41 year old  female who presents for annual exam.     She has not gotten her menses.  She continues to breast feed.  Had  on 21 with right sulcal laceration.  Feels her pain has improved compared to August postpartum visit.  She is not currently considering pregnancy.  She is not using anything for contraception.  Besides routine health maintenance, her additional concerns is dyspareunia.  She also notes yellowish vaginal discharge.  She states that she has pain with intercourse and they do use lubrication, which somewhat helps.  When topical estrogen is mentioned she does recall she needed to use this postpartum with her son.    GYNECOLOGIC HISTORY:  Sexually active with male partner  History sexually transmitted infections:No STD history  HOWARD exposure: No  History of abnormal Pap smear: yes  Family history of breast CA: No  Family history of uterine/ovarian CA: No    Family history of colon CA: No    HEALTH MAINTENANCE:  Cholesterol: (No results found for: CHOL History of abnormal lipids: No  Mammo: NA . History of abnormal Mammo: YES, No, Not applicable.  Regular Self Breast Exams: No  Calcium/Vitamin D intake: source:  dietary supplement(s) Adequate? No  TSH: (No results found for: TSH )  Pap; (  Lab Results   Component Value Date    PAP Abnormal 2020    )  Pap   2018- NILM HPV positive other high risk types  2020 NIL HPV negative    HISTORY:  OB History    Para Term  AB Living   2 2 2 0 0 2   SAB IAB Ectopic Multiple Live Births   0 0 0 0 2      # Outcome Date GA Lbr Jalen/2nd Weight Sex Delivery Anes PTL Lv   2 Term 21 40w3d 03:35 / 00:22 2.948 kg (6 lb 8 oz) F Vag-Spont EPI N ANDRES      Name: JONATANFEMALE-ESTRELLITA      Apgar1: 8  Apgar5: 9   1 Term 20 38w2d  2.608 kg (5 lb 12 oz) M Vag-Spont   ANDRES     PMH- none  Past Surgical History:   Procedure Laterality Date     ENT SURGERY      [place] teeth     ORTHOPEDIC SURGERY      Knne surgery, lateral  "release       Social History  Denies any tobacco, alcohol, or drug use     Current Outpatient Medications:      ascorbic acid (VITAMIN C) 500 MG CPCR CR capsule, , Disp: , Rfl:      calcium carbonate (OS-JUANITO) 1500 (600 Ca) MG tablet, Take 600 mg by mouth, Disp: , Rfl:      cholecalciferol 50 MCG (2000 UT) CAPS, , Disp: , Rfl:      ferrous sulfate (FEROSUL) 325 (65 Fe) MG tablet, Take 325 mg by mouth, Disp: , Rfl:      fish oil-omega-3 fatty acids 1000 MG capsule, , Disp: , Rfl:      Prenatal Vit-Fe Fumarate-FA (PRENATAL MULTIVITAMIN W/IRON) 27-0.8 MG tablet, Take 1 tablet by mouth daily, Disp: , Rfl:      Allergies   Allergen Reactions     Codeine Other (See Comments) and Nausea and Vomiting     Chest tightness       EXAM:  /59 (BP Location: Right arm, Patient Position: Sitting, Cuff Size: Adult Regular)   Pulse 69   Ht 1.702 m (5' 7\")   Wt 54.3 kg (119 lb 9.6 oz)   BMI 18.73 kg/m     BMI: Body mass index is 18.73 kg/m .  Constitutional: healthy, alert and no distress  Head: Normocephalic.    Cardiovascular: regular rate  Respiratory: breathing comfortably on room air  : Atrophic appearing external genitalia, atrophic appearing vaginal mucosa, normal appearing cervix, moderate amount of yellow/white vaginal discharge, patient with tenderness to palpation 1cm above vaginal introitus bilaterally, well healed appearing right sulcal laceration site   Musculoskeletal: extremities normal  Skin: no suspicious lesions or rashes  Psychiatric: Affect appropriate, cooperative,mentation appears normal.       ASSESSMENT:  41 year old female with satisfactory annual exam  (Z01.419) Well woman exam with routine gynecological exam  (primary encounter diagnosis)  COUNSELING:   Reviewed preventive health counseling, as reflected in patient instructions       Contraception   reports that she has quit smoking. She has never used smokeless tobacco.    Body mass index is 18.73 kg/m .      (R10.2) Vaginal pain  Comment: " Patient with atrophic appearing vaginal mucosa, likely due to breast feeding and postpartum state.  Discussed additional pain seems muscular to palpation, which pelvic floor PT may be helpful with.  Encouraged continued use of lubricants with intercourse.    Plan: Physical Therapy Referral, conjugated estrogens        (PREMARIN) 0.625 MG/GM vaginal cream    (N89.8) Vaginal discharge  Comment: patient with symptomatic discharge  Plan: Wet prep - Clinic Collect    (Z12.4) Cervical cancer screening  Comment: patient due for pap  Plan: Pap screen with HPV - recommended age 30 - 65         years    (N76.0,  B96.89) Bacterial vaginosis  Comment: Patient with symptomatic discharge and clue cells on wet prep.  Plan to treat with PO flagyl.  Plan: metroNIDAZOLE (FLAGYL) 500 MG tablet    Lis Adrian MD

## 2021-11-16 ENCOUNTER — TELEPHONE (OUTPATIENT)
Dept: OBGYN | Facility: CLINIC | Age: 41
End: 2021-11-16
Payer: COMMERCIAL

## 2021-11-16 DIAGNOSIS — R10.2 VAGINAL PAIN: Primary | ICD-10-CM

## 2021-11-16 NOTE — TELEPHONE ENCOUNTER
Prior Authorization Retail Medication Request    Medication/Dose: Premarin 0.625mg/gm cream  ICD code (if different than what is on RX):  R102  Previously Tried and Failed:    Rationale:      [place] [facility] [place]:  [id number]      Pharmacy Information (if different than what is on RX)  Name:  [facility]  Phone:  [phone]

## 2021-11-16 NOTE — TELEPHONE ENCOUNTER
Central Prior Authorization Team   Phone: [phone]    PA Initiation    Medication: Premarin cream  Insurance Company: [employer] - Phone [phone] Fax [phone]  Pharmacy Filling the Rx: [place], [place] - [address]  Filling Pharmacy Phone: [phone]  Filling Pharmacy Fax:    Start Date: 11/16/2021

## 2021-11-17 LAB
BKR LAB AP GYN ADEQUACY: NORMAL
BKR LAB AP GYN INTERPRETATION: NORMAL
BKR LAB AP HPV REFLEX: NORMAL
BKR LAB AP PREVIOUS ABNORMAL: NORMAL
PATH REPORT.COMMENTS IMP SPEC: NORMAL
PATH REPORT.COMMENTS IMP SPEC: NORMAL
PATH REPORT.RELEVANT HX SPEC: NORMAL

## 2021-11-17 NOTE — TELEPHONE ENCOUNTER
PRIOR AUTHORIZATION DENIED    Medication: Premarin cream-DENIED     Denial Date: 11/17/2021    Denial Rational: Coverage is provided when the patient has had a trial/failure of 2 preferred formulary medications: ESTRADIOL, YUVAFEM.         Appeal Information: If the provider would like to appeal this denial, please provide a letter of medical necessity. Please also include any therapies that the patient has tried and their outcomes. The patient's insurance company will also require the provider to address why the insurance preferred options are not appropriate in the patient's therapy.  The reason could be that the preferred options will harm the patient; either physically or mentally. They are contraindicated to the patient; or the patient has already been taking the requested medication and changing the therapy would change the outcome of their therapy.    Once it has been completed and placed in the patient's chart, notify the Central PA Team ([facility] MED) and the appeal can be initiated on behalf of the patient and provider.

## 2021-11-18 RX ORDER — ESTRADIOL 0.1 MG/G
1 CREAM VAGINAL
Qty: 42.5 G | Refills: 0 | Status: SHIPPED | OUTPATIENT
Start: 2021-11-18 | End: 2023-10-09

## 2021-11-18 NOTE — TELEPHONE ENCOUNTER
Premarin prior auth was denied.  Do you want to order something else?  estradiol or yuvafem must be tried prior to premarin being approved.  Korin Garcia RN

## 2021-11-19 LAB
HUMAN PAPILLOMA VIRUS 16 DNA: NEGATIVE
HUMAN PAPILLOMA VIRUS 18 DNA: NEGATIVE
HUMAN PAPILLOMA VIRUS FINAL DIAGNOSIS: NORMAL
HUMAN PAPILLOMA VIRUS OTHER HR: NEGATIVE

## 2021-11-22 ENCOUNTER — PATIENT OUTREACH (OUTPATIENT)
Dept: OBGYN | Facility: CLINIC | Age: 41
End: 2021-11-22
Payer: COMMERCIAL

## 2021-11-22 NOTE — RESULT ENCOUNTER NOTE
Cc'd to provider as FYI only due to hx.  No response needed unless prefer a change in plan.    Normal pap/Neg HPV letter sent through [employer] results. Next pap smear and HPV due in 1 year.     Ilda Bonds, RN, BSN  Pap Tracking Nurse

## 2022-01-19 PROBLEM — M25.511 ACUTE PAIN OF RIGHT SHOULDER: Status: RESOLVED | Noted: 2021-09-20 | Resolved: 2022-01-19

## 2022-02-07 ENCOUNTER — MEDICAL CORRESPONDENCE (OUTPATIENT)
Dept: HEALTH INFORMATION MANAGEMENT | Facility: CLINIC | Age: 42
End: 2022-02-07
Payer: COMMERCIAL

## 2022-06-20 NOTE — PROGRESS NOTES
Assessment & Plan   Problem List Items Addressed This Visit    None     Visit Diagnoses     Chronic right-sided low back pain with right-sided sciatica    -  Primary    Relevant Orders    MR Lumbar Spine w/o Contrast    Spine Referral    Hip pain, right        Relevant Orders    MR Lumbar Spine w/o Contrast    Spine Referral    XR Hip Right 2-3 Views    Closed fracture of coccyx, sequela             35 minutes spent on the date of the encounter doing chart review, history and exam, documentation and further activities per the note       See Patient Instructions    No follow-ups on file.    DIONNE Lance CNP  [facility] is a 41 year old, presenting for the following health issues:  Musculoskeletal Problem      Musculoskeletal problem/pain    History of Present Illness       Reason for visit:  Hip pain  Symptom onset:  More than a month  Symptom intensity:  Severe  Symptom progression:  Worsening  Had these symptoms before:  No  What makes it worse:  Sitting more than 20 min  What makes it better:  No    She eats 2-3 servings of fruits and vegetables daily.She consumes 3 sweetened beverage(s) daily.She exercises with enough effort to increase her heart rate 20 to 29 minutes per day.  She exercises with enough effort to increase her heart rate 7 days per week.   She is taking medications regularly.     Pain started a couple months & gets worse with sitting longer than 20 minutes. No previous injury to hip. She has had multiple fractures of her tailbone.  Feels like her sciatic nerve is irritated as well; hard to walk yesterday, shooting pains down right leg.  She lay on her right side a lot during her pregnancy; her child is now 11 months old.   She goes on lots of walks; occasionally does light yoga. No history of running; lots of stairs or other overuse.  Has increased level of exercise in the past month after she noticed pain worsening. Sitting makes it worse,  "feels aching; but no other movements make it worse.   Has broken her tailbone 4 times (highschool twice; once as a young adult, and once during delivery with her son).           Review of Systems   Constitutional, HEENT, cardiovascular, pulmonary, gi and gu systems are negative, except as otherwise noted.      Objective    /58   Pulse 82   Temp 98.8  F (37.1  C)   Resp 14   Ht 1.68 m (5' 6.14\")   Wt 51.7 kg (114 lb)   LMP 05/28/2022 (Approximate)   SpO2 99%   BMI 18.32 kg/m    Body mass index is 18.32 kg/m .  Physical Exam   GENERAL: healthy, alert and no distress  RESP: lungs clear to auscultation - no rales, rhonchi or wheezes  CV: regular rate and rhythm, normal S1 S2, no S3 or S4, no murmur, click or rub, no peripheral edema and peripheral pulses strong  ABDOMEN: soft, nontender, no hepatosplenomegaly, no masses and bowel sounds normal  MS: spine exam shows tender at right sciatic notch and paralumbar muscles right side. Normal ROM of spine. Normal ROM of bilateral hips; has some mild pain with external rotation of hip; no tenderness at greater trochanter    Office Visit on 11/15/2021   Component Date Value Ref Range Status     Trichomonas 11/15/2021 Absent  Absent Final     Yeast 11/15/2021 Absent  Absent Final     Clue Cells 11/15/2021 Present (A) Absent Final     WBCs/high power field 11/15/2021 1+ (A) None Final     Interpretation 11/15/2021 Negative for Intraepithelial Lesion or Malignancy (NILM)    Final     Comment 11/15/2021    Final                    Value:This result contains rich text formatting which cannot be displayed here.     Specimen Adequacy 11/15/2021 Satisfactory for evaluation, endocervical/transformation zone component present   Final     Clinical Information 11/15/2021    Final                    Value:This result contains rich text formatting which cannot be displayed here.     Reflex Testing 11/15/2021 Yes regardless of result   Final     Previous Abnormal? 11/15/2021    " Final                    Value:This result contains rich text formatting which cannot be displayed here.     Performing Labs 11/15/2021    Final                    Value:This result contains rich text formatting which cannot be displayed here.     Other HR HPV 11/15/2021 Negative  Negative Final     HPV16 DNA 11/15/2021 Negative  Negative Final     HPV18 DNA 11/15/2021 Negative  Negative Final     FINAL DIAGNOSIS 11/15/2021    Final                    Value:This result contains rich text formatting which cannot be displayed here.               .  ..

## 2022-06-21 ENCOUNTER — OFFICE VISIT (OUTPATIENT)
Dept: FAMILY MEDICINE | Facility: CLINIC | Age: 42
End: 2022-06-21
Payer: COMMERCIAL

## 2022-06-21 VITALS
RESPIRATION RATE: 14 BRPM | SYSTOLIC BLOOD PRESSURE: 100 MMHG | HEART RATE: 82 BPM | WEIGHT: 114 LBS | BODY MASS INDEX: 18.32 KG/M2 | OXYGEN SATURATION: 99 % | DIASTOLIC BLOOD PRESSURE: 58 MMHG | HEIGHT: 66 IN | TEMPERATURE: 98.8 F

## 2022-06-21 DIAGNOSIS — G89.29 CHRONIC RIGHT-SIDED LOW BACK PAIN WITH RIGHT-SIDED SCIATICA: Primary | ICD-10-CM

## 2022-06-21 DIAGNOSIS — S32.2XXS CLOSED FRACTURE OF COCCYX, SEQUELA: ICD-10-CM

## 2022-06-21 DIAGNOSIS — M54.41 CHRONIC RIGHT-SIDED LOW BACK PAIN WITH RIGHT-SIDED SCIATICA: Primary | ICD-10-CM

## 2022-06-21 DIAGNOSIS — M25.551 HIP PAIN, RIGHT: ICD-10-CM

## 2022-06-21 PROCEDURE — 99214 OFFICE O/P EST MOD 30 MIN: CPT | Performed by: NURSE PRACTITIONER

## 2022-06-21 ASSESSMENT — PAIN SCALES - GENERAL: PAINLEVEL: SEVERE PAIN (6)

## 2022-06-29 ENCOUNTER — ANCILLARY PROCEDURE (OUTPATIENT)
Dept: MRI IMAGING | Facility: CLINIC | Age: 42
End: 2022-06-29
Attending: NURSE PRACTITIONER
Payer: COMMERCIAL

## 2022-06-29 ENCOUNTER — ANCILLARY PROCEDURE (OUTPATIENT)
Dept: GENERAL RADIOLOGY | Facility: CLINIC | Age: 42
End: 2022-06-29
Attending: NURSE PRACTITIONER
Payer: COMMERCIAL

## 2022-06-29 DIAGNOSIS — M25.551 HIP PAIN, RIGHT: ICD-10-CM

## 2022-06-29 DIAGNOSIS — G89.29 CHRONIC RIGHT-SIDED LOW BACK PAIN WITH RIGHT-SIDED SCIATICA: ICD-10-CM

## 2022-06-29 DIAGNOSIS — M54.41 CHRONIC RIGHT-SIDED LOW BACK PAIN WITH RIGHT-SIDED SCIATICA: ICD-10-CM

## 2022-06-29 PROCEDURE — 73502 X-RAY EXAM HIP UNI 2-3 VIEWS: CPT | Mod: RT | Performed by: RADIOLOGY

## 2022-06-29 PROCEDURE — 72148 MRI LUMBAR SPINE W/O DYE: CPT | Mod: GC | Performed by: STUDENT IN AN ORGANIZED HEALTH CARE EDUCATION/TRAINING PROGRAM

## 2022-07-12 ENCOUNTER — MYC MEDICAL ADVICE (OUTPATIENT)
Dept: FAMILY MEDICINE | Facility: CLINIC | Age: 42
End: 2022-07-12

## 2022-07-12 DIAGNOSIS — M25.551 HIP PAIN, RIGHT: Primary | ICD-10-CM

## 2022-07-13 RX ORDER — MELOXICAM 15 MG/1
15 TABLET ORAL DAILY
Qty: 30 TABLET | Refills: 1 | Status: SHIPPED | OUTPATIENT
Start: 2022-07-13 | End: 2023-10-09

## 2022-08-02 NOTE — TELEPHONE ENCOUNTER
Patient calling in. Still has questions about the imaging results. Saw [employer] message from PCP that imaging showed mild arthritis but also read imaging report that were normal, never got called by ortho to schedule (I gave her this number now). She picked up the meloxicam but it made her feel very off, nauseous and light headed so stopped taking it. Pain is the same. Mostly if sits too long then hurts but this is not all the time since she has too small children so is busy anyways.    1) Would like to know what part of xray showed concern for arthritis?  2) If is arthritis/concerns for this, how quickly does she need to go in and see ortho?   3) If no concern about this, ok to watch and try more conservative/not meloxicam for treating?    Overall just looking for more clarity on next steps.    Alka VALERIO RN

## 2022-08-03 ENCOUNTER — APPOINTMENT (OUTPATIENT)
Dept: URBAN - METROPOLITAN AREA CLINIC 258 | Age: 42
Setting detail: DERMATOLOGY
End: 2022-08-04

## 2022-08-03 VITALS — WEIGHT: 115 LBS | HEIGHT: 67 IN

## 2022-08-03 DIAGNOSIS — L21.8 OTHER SEBORRHEIC DERMATITIS: ICD-10-CM

## 2022-08-03 DIAGNOSIS — Z80.8 FAMILY HISTORY OF MALIGNANT NEOPLASM OF OTHER ORGANS OR SYSTEMS: ICD-10-CM

## 2022-08-03 DIAGNOSIS — D49.2 NEOPLASM OF UNSPECIFIED BEHAVIOR OF BONE, SOFT TISSUE, AND SKIN: ICD-10-CM

## 2022-08-03 DIAGNOSIS — Z71.89 OTHER SPECIFIED COUNSELING: ICD-10-CM

## 2022-08-03 DIAGNOSIS — L81.4 OTHER MELANIN HYPERPIGMENTATION: ICD-10-CM

## 2022-08-03 PROBLEM — D23.72 OTHER BENIGN NEOPLASM OF SKIN OF LEFT LOWER LIMB, INCLUDING HIP: Status: ACTIVE | Noted: 2022-08-03

## 2022-08-03 PROCEDURE — 11102 TANGNTL BX SKIN SINGLE LES: CPT

## 2022-08-03 PROCEDURE — 99203 OFFICE O/P NEW LOW 30 MIN: CPT | Mod: 25

## 2022-08-03 PROCEDURE — OTHER PRESCRIPTION: OTHER

## 2022-08-03 PROCEDURE — OTHER MIPS QUALITY: OTHER

## 2022-08-03 PROCEDURE — OTHER COUNSELING: OTHER

## 2022-08-03 PROCEDURE — OTHER REASSURANCE: OTHER

## 2022-08-03 PROCEDURE — 11103 TANGNTL BX SKIN EA SEP/ADDL: CPT

## 2022-08-03 PROCEDURE — OTHER BIOPSY BY SHAVE METHOD: OTHER

## 2022-08-03 RX ORDER — CLOBETASOL PROPIONATE 0.5 MG/ML
SOLUTION TOPICAL
Qty: 50 | Refills: 1 | Status: ERX | COMMUNITY
Start: 2022-08-03

## 2022-08-03 RX ORDER — KETOCONAZOLE 20 MG/ML
SHAMPOO, SUSPENSION TOPICAL BIW
Qty: 120 | Refills: 1 | Status: ERX | COMMUNITY
Start: 2022-08-03

## 2022-08-03 RX ORDER — TRIAMCINOLONE ACETONIDE 1 MG/G
OINTMENT TOPICAL
Qty: 80 | Refills: 1 | Status: ERX | COMMUNITY
Start: 2022-08-03

## 2022-08-03 ASSESSMENT — LOCATION ZONE DERM
LOCATION ZONE: TRUNK
LOCATION ZONE: SCALP

## 2022-08-03 ASSESSMENT — LOCATION SIMPLE DESCRIPTION DERM
LOCATION SIMPLE: LEFT UPPER BACK
LOCATION SIMPLE: RIGHT UPPER BACK
LOCATION SIMPLE: SCALP

## 2022-08-03 ASSESSMENT — LOCATION DETAILED DESCRIPTION DERM
LOCATION DETAILED: RIGHT CENTRAL PARIETAL SCALP
LOCATION DETAILED: LEFT SUPERIOR UPPER BACK
LOCATION DETAILED: LEFT MEDIAL UPPER BACK
LOCATION DETAILED: RIGHT MEDIAL UPPER BACK

## 2022-08-03 NOTE — PROCEDURE: BIOPSY BY SHAVE METHOD
Curettage Text: The wound bed was treated with curettage after the biopsy was performed.
Validate Anticipated Plan: No
Was A Bandage Applied: Yes
Hemostasis: Drysol
Size Of Lesion In Cm: 0
Information: Selecting Yes will display possible errors in your note based on the variables you have selected. This validation is only offered as a suggestion for you. PLEASE NOTE THAT THE VALIDATION TEXT WILL BE REMOVED WHEN YOU FINALIZE YOUR NOTE. IF YOU WANT TO FAX A PRELIMINARY NOTE YOU WILL NEED TO TOGGLE THIS TO 'NO' IF YOU DO NOT WANT IT IN YOUR FAXED NOTE.
Dressing: bandage
Notification Instructions: Patient will be notified of biopsy results. However, patient instructed to call the office if not contacted within 2 weeks.
Anesthesia Volume In Cc (Will Not Render If 0): 0.5
Cryotherapy Text: The wound bed was treated with cryotherapy after the biopsy was performed.
Consent: Written consent was obtained and risks were reviewed including but not limited to scarring, infection, bleeding, scabbing, incomplete removal, nerve damage and allergy to anesthesia.
Post-Care Instructions: I reviewed with the patient in detail post-care instructions. Patient is to keep the biopsy site dry overnight, and then apply bacitracin twice daily until healed. Patient may apply hydrogen peroxide soaks to remove any crusting.
Anesthesia Type: 0.5% lidocaine with 1:200,000 epinephrine and a 1:10 solution of 8.4% sodium bicarbonate
Billing Type: Third-Party Bill
Silver Nitrate Text: The wound bed was treated with silver nitrate after the biopsy was performed.
Detail Level: Detailed
Electrodesiccation Text: The wound bed was treated with electrodesiccation after the biopsy was performed.
Biopsy Type: H and E
Wound Care: Petrolatum
Depth Of Biopsy: dermis
Electrodesiccation And Curettage Text: The wound bed was treated with electrodesiccation and curettage after the biopsy was performed.
Type Of Destruction Used: Curettage
Biopsy Method: Personna blade

## 2022-08-03 NOTE — PROCEDURE: MIPS QUALITY
Fasting previsit blood tests have been ordered.  
Detail Level: Detailed
Quality 431: Preventive Care And Screening: Unhealthy Alcohol Use - Screening: Patient not identified as an unhealthy alcohol user when screened for unhealthy alcohol use using a systematic screening method
Quality 265: Biopsy Follow-Up: Biopsy results reviewed, communicated, tracked, and documented
Quality 226: Preventive Care And Screening: Tobacco Use: Screening And Cessation Intervention: Patient screened for tobacco use and is an ex/non-smoker
Quality 130: Documentation Of Current Medications In The Medical Record: Current Medications Documented

## 2022-08-03 NOTE — HPI: SKIN LESION
What Type Of Note Output Would You Prefer (Optional)?: Standard Output
Has Your Skin Lesion Been Treated?: not been treated
Is This A New Presentation, Or A Follow-Up?: Skin Lesions
Which Family Member (Optional)?: Mother

## 2022-10-02 ENCOUNTER — HEALTH MAINTENANCE LETTER (OUTPATIENT)
Age: 42
End: 2022-10-02

## 2022-10-31 ENCOUNTER — PATIENT OUTREACH (OUTPATIENT)
Dept: OBGYN | Facility: CLINIC | Age: 42
End: 2022-10-31

## 2022-10-31 DIAGNOSIS — R87.810 CERVICAL HIGH RISK HPV (HUMAN PAPILLOMAVIRUS) TEST POSITIVE: ICD-10-CM

## 2022-12-30 NOTE — TELEPHONE ENCOUNTER
FYI to provider - Patient is lost to pap tracking follow-up. Attempts to contact pt have been made per reminder process and there has been no reply and/or no appt scheduled. Contact hx listed below.     4/20/16 NIL pap, + HR HPV  12/27/18 NIL pap,+ HR HPV  12/18/18 [place]: Bx-NOLBERTO 1-2.    1/15/19 LEEP - NOLBERTO 1  7/28/20 NIL pap, Neg HPV  11/15/21 NIL pap, Neg HPV. Plan: cotest in 1 year  10/31/22 Reminder MyChart  12/1/22 Reminder call - LM  12/30/22 Lost to follow-up for pap tracking

## 2023-02-11 ENCOUNTER — HEALTH MAINTENANCE LETTER (OUTPATIENT)
Age: 43
End: 2023-02-11

## 2023-09-05 ENCOUNTER — TELEPHONE (OUTPATIENT)
Dept: OBGYN | Facility: CLINIC | Age: 43
End: 2023-09-05

## 2023-09-05 NOTE — TELEPHONE ENCOUNTER
[facility]    Phone Message    May a detailed message be left on voicemail: yes     Reason for Call: Order(s): Other:   Reason for requested: Order for Mammogram  Date needed: ASAP  Provider name: Dr. Adrian    Action Taken: Message routed to:  Other: [facility]    Travel Screening: Not Applicable

## 2023-10-08 ASSESSMENT — ENCOUNTER SYMPTOMS
DIARRHEA: 0
FEVER: 0
PARESTHESIAS: 0
ARTHRALGIAS: 0
MYALGIAS: 0
HEADACHES: 0
NAUSEA: 0
NERVOUS/ANXIOUS: 0
CONSTIPATION: 0
WEAKNESS: 0
PALPITATIONS: 0
COUGH: 0
ABDOMINAL PAIN: 0
SORE THROAT: 0
DIZZINESS: 0
HEARTBURN: 0
HEMATURIA: 0
FREQUENCY: 0
CHILLS: 0
HEMATOCHEZIA: 0
JOINT SWELLING: 0
EYE PAIN: 0
DYSURIA: 0
BREAST MASS: 0
SHORTNESS OF BREATH: 0

## 2023-10-09 ENCOUNTER — ANCILLARY PROCEDURE (OUTPATIENT)
Dept: MAMMOGRAPHY | Facility: CLINIC | Age: 43
End: 2023-10-09
Attending: OBSTETRICS & GYNECOLOGY
Payer: COMMERCIAL

## 2023-10-09 ENCOUNTER — OFFICE VISIT (OUTPATIENT)
Dept: OBGYN | Facility: CLINIC | Age: 43
End: 2023-10-09

## 2023-10-09 VITALS
SYSTOLIC BLOOD PRESSURE: 107 MMHG | HEART RATE: 63 BPM | OXYGEN SATURATION: 99 % | BODY MASS INDEX: 19.43 KG/M2 | WEIGHT: 120.9 LBS | DIASTOLIC BLOOD PRESSURE: 64 MMHG

## 2023-10-09 DIAGNOSIS — Z12.4 CERVICAL CANCER SCREENING: ICD-10-CM

## 2023-10-09 DIAGNOSIS — Z12.31 VISIT FOR SCREENING MAMMOGRAM: ICD-10-CM

## 2023-10-09 DIAGNOSIS — Z01.419 WELL WOMAN EXAM WITH ROUTINE GYNECOLOGICAL EXAM: Primary | ICD-10-CM

## 2023-10-09 PROCEDURE — 77067 SCR MAMMO BI INCL CAD: CPT | Mod: 26 | Performed by: RADIOLOGY

## 2023-10-09 PROCEDURE — 77067 SCR MAMMO BI INCL CAD: CPT

## 2023-10-09 PROCEDURE — 88175 CYTOPATH C/V AUTO FLUID REDO: CPT | Performed by: OBSTETRICS & GYNECOLOGY

## 2023-10-09 PROCEDURE — 99396 PREV VISIT EST AGE 40-64: CPT | Performed by: OBSTETRICS & GYNECOLOGY

## 2023-10-09 PROCEDURE — 87624 HPV HI-RISK TYP POOLED RSLT: CPT | Performed by: OBSTETRICS & GYNECOLOGY

## 2023-10-09 NOTE — PROGRESS NOTES
Estrellita is a 43 year old  female who presents for annual exam.     Menses are regular q 28-30 days and crampy, heavy, and regular lasting 5 days.  Menses flow: heavy.  She feels periods aren't as bad as prior to having kids.  Patient's last menstrual period was 2023 (approximate).. Using condoms for contraception.  She is not currently considering pregnancy.  Besides routine health maintenance, she has no other health concerns today .  GYNECOLOGIC HISTORY:  Menarche: 15  Age at first intercourse: 19 Number of lifetime partners: >6  Estrellita is sexually active with 1 male partner(s) and is currently in monogamous relationship with partner.    History sexually transmitted infections:HPV  STI testing offered?  Declined  HOWARD exposure: No  History of abnormal Pap smear:YES.    16 NIL pap, + HR HPV  1227/18 NIL pap,+ HR HPV  12/18/18 [place]: Bx-NOLBERTO 1-2.    1/15/19 LEEP - NOLBERTO 1  20 NIL pap, Neg HPV  11/15/21 NIL pap, Neg HPV.  Family history of breast CA: No  Family history of uterine/ovarian CA: No    Family history of colon CA: No    HEALTH MAINTENANCE:  Cholesterol: (No results found for: CHOL History of abnormal lipids: No  Mammo: Yes . History of abnormal Mammo: Just had first one an hour before appointment - no results yet.  Regular Self Breast Exams: No  Calcium/Vitamin D intake: source:  dairy Adequate? Yes    HISTORY:  OB History    Para Term  AB Living   2 2 2 0 0 2   SAB IAB Ectopic Multiple Live Births   0 0 0 0 2      # Outcome Date GA Lbr Jalen/2nd Weight Sex Delivery Anes PTL Lv   2 Term 21 40w3d 03:35 / 00:22 2.948 kg (6 lb 8 oz) F Vag-Spont EPI N ANDRES      Name: JONATAN,FEMALE-ESTRELLITA      Apgar1: 8  Apgar5: 9   1 Term 20 38w2d  2.608 kg (5 lb 12 oz) M Vag-Spont   ANDRES     Past Medical History:   Diagnosis Date    Closed fracture of coccyx with routine healing     Has broken 4x;1) cheerleading fall; 2) soccer; 3) fell down stairs; 4) labor with son    Closed fracture of nasal  bone with routine healing     Has broken 2x; 1) was hit in face with soccer ball; 2) ladder hit face; 3)     Past Surgical History:   Procedure Laterality Date    ENT SURGERY      [place] teeth    ORTHOPEDIC SURGERY      Knne surgery, lateral release     Family History   Problem Relation Age of Onset    Skin Cancer Mother     No Known Problems Father     No Known Problems Brother     No Known Problems Maternal Grandmother     No Known Problems Maternal Grandfather     No Known Problems Paternal Grandmother     No Known Problems Paternal Grandfather     No Known Problems Daughter     Polycystic Kidney Diease Son         born with one kidney    Breast Cancer No family hx of     Colon Cancer No family hx of     Osteoporosis No family hx of      Social History     Denies any tobacco, alcohol, or drug use.  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     Allergies   Allergen Reactions    Codeine Other (See Comments) and Nausea and Vomiting     Chest tightness       Past medical, surgical, social and family history were reviewed and updated in EPIC.    EXAM:  /64   Pulse 63   Wt 54.8 kg (120 lb 14.4 oz)   LMP 09/26/2023 (Approximate)   SpO2 99%   BMI 19.43 kg/m     BMI: Body mass index is 19.43 kg/m .  Constitutional: healthy, alert and no distress  Head: Normocephalic. No masses, lesions, tenderness or abnormalities  Neck: Neck supple. Trachea midline. No adenopathy. Thyroid symmetric, normal size.   Cardiovascular: regular rate and rhythm  Respiratory: clear to auscultation bilaterally   Gastrointestinal: Abdomen soft, non-tender, non-distended.   : normal appearing external genitalia, normal appearing vaginal mucosa, normal appearing cervix, no vaginal discharge   Musculoskeletal: extremities normal  Skin: no suspicious lesions or rashes  Psychiatric: Affect appropriate, cooperative,mentation appears normal.     ASSESSMENT:  43 year old female with satisfactory annual exam     (Z01.419) Well woman  exam with routine gynecological exam  (primary encounter diagnosis)  COUNSELING:   Reviewed preventive health counseling, as reflected in patient instructions       Regular mammogram screening   reports that she has quit smoking. Her smoking use included cigarettes. She has a 3.75 pack-year smoking history. She quit smokeless tobacco use about 5 years ago.    Body mass index is 19.43 kg/m .    (Z12.4) Cervical cancer screening  Comment: due for pap  Plan: Pap diagnostic with HPV          Lis Adrian MD

## 2023-10-11 LAB
BKR LAB AP GYN ADEQUACY: NORMAL
BKR LAB AP GYN INTERPRETATION: NORMAL
BKR LAB AP HPV REFLEX: NORMAL
BKR LAB AP LMP: NORMAL
BKR LAB AP PREVIOUS ABNL DX: NORMAL
BKR LAB AP PREVIOUS ABNORMAL: NORMAL
PATH REPORT.COMMENTS IMP SPEC: NORMAL
PATH REPORT.COMMENTS IMP SPEC: NORMAL
PATH REPORT.RELEVANT HX SPEC: NORMAL

## 2024-12-14 ENCOUNTER — HEALTH MAINTENANCE LETTER (OUTPATIENT)
Age: 44
End: 2024-12-14